# Patient Record
Sex: MALE | Race: WHITE | NOT HISPANIC OR LATINO | Employment: OTHER | URBAN - METROPOLITAN AREA
[De-identification: names, ages, dates, MRNs, and addresses within clinical notes are randomized per-mention and may not be internally consistent; named-entity substitution may affect disease eponyms.]

---

## 2017-03-28 ENCOUNTER — COMMUNICATION - HEALTHEAST (OUTPATIENT)
Dept: FAMILY MEDICINE | Facility: CLINIC | Age: 67
End: 2017-03-28

## 2017-03-28 DIAGNOSIS — G47.00 PERSISTENT DISORDER OF INITIATING OR MAINTAINING SLEEP: ICD-10-CM

## 2017-05-01 ENCOUNTER — OFFICE VISIT - HEALTHEAST (OUTPATIENT)
Dept: FAMILY MEDICINE | Facility: CLINIC | Age: 67
End: 2017-05-01

## 2017-05-01 DIAGNOSIS — Z00.00 ROUTINE GENERAL MEDICAL EXAMINATION AT A HEALTH CARE FACILITY: ICD-10-CM

## 2017-05-01 DIAGNOSIS — F41.1 GENERALIZED ANXIETY DISORDER: ICD-10-CM

## 2017-05-01 DIAGNOSIS — Z13.1 SCREENING FOR DIABETES MELLITUS: ICD-10-CM

## 2017-05-01 DIAGNOSIS — Z13.29 SCREENING FOR THYROID DISORDER: ICD-10-CM

## 2017-05-01 DIAGNOSIS — R94.8 ABNORMAL POSITRON EMISSION TOMOGRAPHY (PET) SCAN: ICD-10-CM

## 2017-05-01 DIAGNOSIS — G47.00 INSOMNIA: ICD-10-CM

## 2017-05-01 DIAGNOSIS — Z12.5 SCREENING FOR PROSTATE CANCER: ICD-10-CM

## 2017-05-01 DIAGNOSIS — R73.01 ELEVATED FASTING GLUCOSE: ICD-10-CM

## 2017-05-01 DIAGNOSIS — Z13.0 SCREENING FOR DEFICIENCY ANEMIA: ICD-10-CM

## 2017-05-01 DIAGNOSIS — Z23 NEED FOR TETANUS BOOSTER: ICD-10-CM

## 2017-05-01 DIAGNOSIS — Z13.220 SCREENING FOR CHOLESTEROL LEVEL: ICD-10-CM

## 2017-05-01 LAB
CHOLEST SERPL-MCNC: 175 MG/DL
FASTING STATUS PATIENT QL REPORTED: YES
HBA1C MFR BLD: 5.8 % (ref 3.5–6)
HDLC SERPL-MCNC: 57 MG/DL
LDLC SERPL CALC-MCNC: 102 MG/DL
PSA SERPL-MCNC: 0.4 NG/ML (ref 0–4.5)
TRIGL SERPL-MCNC: 82 MG/DL

## 2017-05-01 ASSESSMENT — MIFFLIN-ST. JEOR: SCORE: 1663.24

## 2017-05-02 ENCOUNTER — HOSPITAL ENCOUNTER (OUTPATIENT)
Dept: ULTRASOUND IMAGING | Facility: CLINIC | Age: 67
Discharge: HOME | End: 2017-05-02
Attending: NURSE PRACTITIONER

## 2017-05-02 ENCOUNTER — HOSPITAL ENCOUNTER (OUTPATIENT)
Dept: CT IMAGING | Facility: CLINIC | Age: 67
Discharge: HOME | End: 2017-05-02
Attending: NURSE PRACTITIONER

## 2017-05-02 ENCOUNTER — COMMUNICATION - HEALTHEAST (OUTPATIENT)
Dept: FAMILY MEDICINE | Facility: CLINIC | Age: 67
End: 2017-05-02

## 2017-05-02 DIAGNOSIS — R94.8 ABNORMAL POSITRON EMISSION TOMOGRAPHY (PET) SCAN: ICD-10-CM

## 2017-05-02 DIAGNOSIS — K11.8 MASS OF PAROTID GLAND: ICD-10-CM

## 2017-05-03 ENCOUNTER — AMBULATORY - HEALTHEAST (OUTPATIENT)
Dept: FAMILY MEDICINE | Facility: CLINIC | Age: 67
End: 2017-05-03

## 2017-05-03 DIAGNOSIS — K11.8 PAROTID MASS: ICD-10-CM

## 2017-05-08 ENCOUNTER — HOSPITAL ENCOUNTER (OUTPATIENT)
Dept: ULTRASOUND IMAGING | Facility: CLINIC | Age: 67
Discharge: HOME | End: 2017-05-08
Attending: NURSE PRACTITIONER

## 2017-05-08 DIAGNOSIS — K11.8 PAROTID MASS: ICD-10-CM

## 2017-05-08 DIAGNOSIS — R22.0 LOCALIZED SWELLING, MASS AND LUMP, HEAD: ICD-10-CM

## 2017-05-09 ENCOUNTER — COMMUNICATION - HEALTHEAST (OUTPATIENT)
Dept: FAMILY MEDICINE | Facility: CLINIC | Age: 67
End: 2017-05-09

## 2017-05-09 LAB
LAB AP CHARGES (HE HISTORICAL CONVERSION): NORMAL
LAB AP INITIAL CYTO EVAL (HE HISTORICAL CONVERSION): NORMAL
LAB MED GENERAL PATH INTERP (HE HISTORICAL CONVERSION): NORMAL
PATH REPORT.COMMENTS IMP SPEC: NORMAL
PATH REPORT.COMMENTS IMP SPEC: NORMAL
PATH REPORT.FINAL DX SPEC: NORMAL
PATH REPORT.MICROSCOPIC SPEC OTHER STN: NORMAL
PATH REPORT.RELEVANT HX SPEC: NORMAL
SPECIMEN DESCRIPTION: NORMAL

## 2017-09-09 ENCOUNTER — HEALTH MAINTENANCE LETTER (OUTPATIENT)
Age: 67
End: 2017-09-09

## 2018-04-04 ENCOUNTER — OFFICE VISIT - HEALTHEAST (OUTPATIENT)
Dept: FAMILY MEDICINE | Facility: CLINIC | Age: 68
End: 2018-04-04

## 2018-04-04 DIAGNOSIS — G89.29 CHRONIC LOW BACK PAIN: ICD-10-CM

## 2018-04-04 DIAGNOSIS — H10.9 CONJUNCTIVITIS: ICD-10-CM

## 2018-04-04 DIAGNOSIS — G44.209 TENSION HEADACHE: ICD-10-CM

## 2018-04-04 DIAGNOSIS — M54.50 CHRONIC LOW BACK PAIN: ICD-10-CM

## 2018-04-04 ASSESSMENT — MIFFLIN-ST. JEOR: SCORE: 1645.1

## 2018-04-05 ENCOUNTER — COMMUNICATION - HEALTHEAST (OUTPATIENT)
Dept: FAMILY MEDICINE | Facility: CLINIC | Age: 68
End: 2018-04-05

## 2018-04-05 DIAGNOSIS — H10.9 CONJUNCTIVITIS: ICD-10-CM

## 2018-04-06 ENCOUNTER — AMBULATORY - HEALTHEAST (OUTPATIENT)
Dept: FAMILY MEDICINE | Facility: CLINIC | Age: 68
End: 2018-04-06

## 2018-04-09 ENCOUNTER — HOSPITAL ENCOUNTER (OUTPATIENT)
Dept: PHYSICAL MEDICINE AND REHAB | Facility: CLINIC | Age: 68
Discharge: HOME | End: 2018-04-09
Attending: FAMILY MEDICINE

## 2018-04-09 DIAGNOSIS — M51.369 DISC DEGENERATION, LUMBAR: ICD-10-CM

## 2018-04-09 DIAGNOSIS — M47.816 LUMBAR FACET ARTHROPATHY: ICD-10-CM

## 2018-05-11 ENCOUNTER — COMMUNICATION - HEALTHEAST (OUTPATIENT)
Dept: FAMILY MEDICINE | Facility: CLINIC | Age: 68
End: 2018-05-11

## 2018-05-11 DIAGNOSIS — G47.00 INSOMNIA: ICD-10-CM

## 2018-05-11 DIAGNOSIS — F41.1 GENERALIZED ANXIETY DISORDER: ICD-10-CM

## 2018-05-23 ENCOUNTER — COMMUNICATION - HEALTHEAST (OUTPATIENT)
Dept: FAMILY MEDICINE | Facility: CLINIC | Age: 68
End: 2018-05-23

## 2019-02-05 ENCOUNTER — RECORDS - HEALTHEAST (OUTPATIENT)
Dept: ADMINISTRATIVE | Facility: OTHER | Age: 69
End: 2019-02-05

## 2019-07-07 ENCOUNTER — COMMUNICATION - HEALTHEAST (OUTPATIENT)
Dept: FAMILY MEDICINE | Facility: CLINIC | Age: 69
End: 2019-07-07

## 2019-07-07 DIAGNOSIS — F41.1 GENERALIZED ANXIETY DISORDER: ICD-10-CM

## 2019-07-07 DIAGNOSIS — G47.00 INSOMNIA: ICD-10-CM

## 2019-08-14 ENCOUNTER — OFFICE VISIT - HEALTHEAST (OUTPATIENT)
Dept: FAMILY MEDICINE | Facility: CLINIC | Age: 69
End: 2019-08-14

## 2019-08-14 DIAGNOSIS — Z79.899 HIGH RISK MEDICATIONS (NOT ANTICOAGULANTS) LONG-TERM USE: ICD-10-CM

## 2019-08-14 DIAGNOSIS — F41.1 GENERALIZED ANXIETY DISORDER: ICD-10-CM

## 2019-08-14 DIAGNOSIS — G47.00 INSOMNIA: ICD-10-CM

## 2019-08-14 DIAGNOSIS — Z86.0100 HISTORY OF COLONIC POLYPS: ICD-10-CM

## 2019-08-14 DIAGNOSIS — Z12.5 SCREENING FOR PROSTATE CANCER: ICD-10-CM

## 2019-08-14 DIAGNOSIS — N39.0 CHRONIC UTI: ICD-10-CM

## 2019-08-14 DIAGNOSIS — R73.03 PRE-DIABETES: ICD-10-CM

## 2019-08-14 LAB
ATRIAL RATE - MUSE: 62 BPM
DIASTOLIC BLOOD PRESSURE - MUSE: NORMAL MMHG
HBA1C MFR BLD: 6 % (ref 3.5–6)
INTERPRETATION ECG - MUSE: NORMAL
P AXIS - MUSE: 61 DEGREES
PR INTERVAL - MUSE: 136 MS
PSA SERPL-MCNC: 0.2 NG/ML (ref 0–4.5)
QRS DURATION - MUSE: 92 MS
QT - MUSE: 398 MS
QTC - MUSE: 403 MS
R AXIS - MUSE: 33 DEGREES
SYSTOLIC BLOOD PRESSURE - MUSE: NORMAL MMHG
T AXIS - MUSE: 56 DEGREES
VENTRICULAR RATE- MUSE: 62 BPM

## 2019-08-14 ASSESSMENT — MIFFLIN-ST. JEOR: SCORE: 1673.45

## 2020-01-24 ENCOUNTER — RECORDS - HEALTHEAST (OUTPATIENT)
Dept: ADMINISTRATIVE | Facility: OTHER | Age: 70
End: 2020-01-24

## 2020-04-15 ENCOUNTER — RECORDS - HEALTHEAST (OUTPATIENT)
Dept: ADMINISTRATIVE | Facility: OTHER | Age: 70
End: 2020-04-15

## 2020-10-03 ENCOUNTER — COMMUNICATION - HEALTHEAST (OUTPATIENT)
Dept: FAMILY MEDICINE | Facility: CLINIC | Age: 70
End: 2020-10-03

## 2020-10-03 DIAGNOSIS — G47.00 INSOMNIA: ICD-10-CM

## 2020-10-03 DIAGNOSIS — F41.1 GENERALIZED ANXIETY DISORDER: ICD-10-CM

## 2020-10-29 ENCOUNTER — OFFICE VISIT - HEALTHEAST (OUTPATIENT)
Dept: FAMILY MEDICINE | Facility: CLINIC | Age: 70
End: 2020-10-29

## 2020-10-29 DIAGNOSIS — R73.03 PRE-DIABETES: ICD-10-CM

## 2020-10-29 DIAGNOSIS — G47.00 INSOMNIA: ICD-10-CM

## 2020-10-29 DIAGNOSIS — M67.912 TENDINOPATHY OF LEFT SHOULDER: ICD-10-CM

## 2020-10-29 DIAGNOSIS — Z13.220 SCREENING FOR CHOLESTEROL LEVEL: ICD-10-CM

## 2020-10-29 DIAGNOSIS — Z51.81 ENCOUNTER FOR THERAPEUTIC DRUG MONITORING: ICD-10-CM

## 2020-10-29 DIAGNOSIS — N39.0 CHRONIC UTI: ICD-10-CM

## 2020-10-29 DIAGNOSIS — F41.1 GENERALIZED ANXIETY DISORDER: ICD-10-CM

## 2020-10-29 LAB
ALBUMIN SERPL-MCNC: 4.2 G/DL (ref 3.5–5)
ALP SERPL-CCNC: 85 U/L (ref 45–120)
ALT SERPL W P-5'-P-CCNC: 30 U/L (ref 0–45)
ANION GAP SERPL CALCULATED.3IONS-SCNC: 11 MMOL/L (ref 5–18)
AST SERPL W P-5'-P-CCNC: 26 U/L (ref 0–40)
BILIRUB SERPL-MCNC: 0.5 MG/DL (ref 0–1)
BUN SERPL-MCNC: 14 MG/DL (ref 8–22)
CALCIUM SERPL-MCNC: 9.1 MG/DL (ref 8.5–10.5)
CHLORIDE BLD-SCNC: 106 MMOL/L (ref 98–107)
CHOLEST SERPL-MCNC: 172 MG/DL
CO2 SERPL-SCNC: 23 MMOL/L (ref 22–31)
CREAT SERPL-MCNC: 0.89 MG/DL (ref 0.7–1.3)
FASTING STATUS PATIENT QL REPORTED: NO
GFR SERPL CREATININE-BSD FRML MDRD: >60 ML/MIN/1.73M2
GLUCOSE BLD-MCNC: 97 MG/DL (ref 70–125)
HBA1C MFR BLD: 5.9 %
HDLC SERPL-MCNC: 60 MG/DL
LDLC SERPL CALC-MCNC: 96 MG/DL
POTASSIUM BLD-SCNC: 4.5 MMOL/L (ref 3.5–5)
PROT SERPL-MCNC: 7.1 G/DL (ref 6–8)
SODIUM SERPL-SCNC: 140 MMOL/L (ref 136–145)
TRIGL SERPL-MCNC: 81 MG/DL

## 2020-10-29 ASSESSMENT — MIFFLIN-ST. JEOR: SCORE: 1645.95

## 2020-11-19 LAB
ATRIAL RATE - MUSE: 52 BPM
DIASTOLIC BLOOD PRESSURE - MUSE: NORMAL
INTERPRETATION ECG - MUSE: NORMAL
P AXIS - MUSE: 63 DEGREES
PR INTERVAL - MUSE: 140 MS
QRS DURATION - MUSE: 90 MS
QT - MUSE: 430 MS
QTC - MUSE: 399 MS
R AXIS - MUSE: 37 DEGREES
SYSTOLIC BLOOD PRESSURE - MUSE: NORMAL
T AXIS - MUSE: 52 DEGREES
VENTRICULAR RATE- MUSE: 52 BPM

## 2021-01-01 ENCOUNTER — TRANSFERRED RECORDS (OUTPATIENT)
Dept: MULTI SPECIALTY CLINIC | Facility: CLINIC | Age: 71
End: 2021-01-01

## 2021-05-30 VITALS — BODY MASS INDEX: 27.44 KG/M2 | WEIGHT: 196 LBS | HEIGHT: 71 IN

## 2021-05-30 NOTE — TELEPHONE ENCOUNTER
RN cannot approve Refill Request    RN can NOT refill this medication PCP messaged that patient is overdue for Physical  and Protocol failed and RN gave three month refill. Last office visit: Visit date not found Last Physical: 5/1/2017 Last MTM visit: Visit date not found Last visit same specialty: 4/4/2018 Jacquelyn Ortiz MD.  Next visit within 3 mo: Visit date not found  Next physical within 3 mo: Visit date not found  Last OV 4/4/2018.  Was due for PE May 2018  Last refill of trazodone was 5/11/2018 for 90/3; last refill of citalopram was 5/11/2018 for 90/3     Kathleen Vega, [facility] Triage/Med Refill 7/7/2019    Requested Prescriptions   Pending Prescriptions Disp Refills     traZODone (DESYREL) 100 MG tablet [Pharmacy Med Name: traZODone HCl Oral Tablet 100 MG] 90 tablet 2     Sig: Take 1 tablet (100 mg total) by mouth at bedtime.       Tricyclics/Misc Antidepressant/Antianxiety Meds Refill Protocol Failed - 7/7/2019  7:01 AM        Failed - PCP or prescribing provider visit in last year     Last office visit with prescriber/PCP: Visit date not found OR same dept: Visit date not found OR same specialty: 4/4/2018 Jacquelyn Ortiz MD  Last physical: 5/1/2017 Last MTM visit: Visit date not found   Next visit within 3 mo: Visit date not found  Next physical within 3 mo: Visit date not found  Prescriber OR PCP: Poornima Correa CNP  Last diagnosis associated with med order: 1. Insomnia  - traZODone (DESYREL) 100 MG tablet [Pharmacy Med Name: traZODone HCl Oral Tablet 100 MG]; Take 1 tablet (100 mg total) by mouth at bedtime.  Dispense: 90 tablet; Refill: 2    2. Generalized anxiety disorder  - citalopram (CELEXA) 10 MG tablet [Pharmacy Med Name: Citalopram Hydrobromide Oral Tablet 10 MG]; Take 1 tablet (10 mg total) by mouth daily.  Dispense: 90 tablet; Refill: 2    If protocol passes may refill for 12 months if within 3 months of last provider visit (or a total of 15 months).              citalopram (CELEXA) 10 MG tablet [Pharmacy Med Name: Citalopram Hydrobromide Oral Tablet 10 MG] 90 tablet 2     Sig: Take 1 tablet (10 mg total) by mouth daily.       SSRI Refill Protocol  Failed - 7/7/2019  7:01 AM        Failed - PCP or prescribing provider visit in last year     Last office visit with prescriber/PCP: Visit date not found OR same dept: Visit date not found OR same specialty: 4/4/2018 Jacquelyn Ortiz MD  Last physical: 5/1/2017 Last MTM visit: Visit date not found   Next visit within 3 mo: Visit date not found  Next physical within 3 mo: Visit date not found  Prescriber OR PCP: Poornima Correa CNP  Last diagnosis associated with med order: 1. Insomnia  - traZODone (DESYREL) 100 MG tablet [Pharmacy Med Name: traZODone HCl Oral Tablet 100 MG]; Take 1 tablet (100 mg total) by mouth at bedtime.  Dispense: 90 tablet; Refill: 2    2. Generalized anxiety disorder  - citalopram (CELEXA) 10 MG tablet [Pharmacy Med Name: Citalopram Hydrobromide Oral Tablet 10 MG]; Take 1 tablet (10 mg total) by mouth daily.  Dispense: 90 tablet; Refill: 2    If protocol passes may refill for 12 months if within 3 months of last provider visit (or a total of 15 months).

## 2021-05-31 NOTE — PROGRESS NOTES
Assessment & Plan   1. Generalized anxiety disorder  Anxiety continues to be well controlled on low-dose citalopram.  He prefers to continue on this medication long-term.  Refilled for 1 year.  Recommended physical with his med check next year.  Normal EKG today with concurrent use of citalopram and trazodone.  - citalopram (CELEXA) 10 MG tablet; Take 1 tablet (10 mg total) by mouth daily. Dispense: 90 tablet; Refill: 3    2. Insomnia  Doing well with trazodone 100 mg.  He states on very rare occasions approximately 3 times a year he takes 150 mg.  - traZODone (DESYREL) 100 MG tablet; Take 1 tablet (100 mg total) by mouth at bedtime.   Dispense: 90 tablet; Refill: 3    3. Pre-diabetes  Recheck labs.  He is engaging in regular physical activity and healthy diet.  No significant family history of diabetes.  - Glycosylated Hemoglobin A1c    4. Screening for prostate cancer  - PSA (Prostatic-Specific Antigen), Annual Screen    5. High risk medications (not anticoagulants) long-term use  - Electrocardiogram Perform - Clinic    6. Chronic UTI  Continue to follow with urology.    7. History of colonic polyps  [facility].    Poornima Correa CNP    Subjective   Chief Complaint:  No chief complaint on file.    HPI:   Artemio Kruse is a 69 y.o. male who presents for medication check.    He states he has been well.  He has been following with [facility] for acute UTI.  He did undergo cystoscopy as well.  He is now on chronic cephalexin therapy.  Also started on Flomax for overactive bladder symptoms.  He states symptoms are much improved with this.    Anxiety:  On citalopram 10mg and trazodone 100mg for sleep.  Believes anxiety continues to be well controlled.  He did try going off the citalopram in the distant past and states he was very irritable and anxious without the medication.  He continues to sleep well with the trazodone.  He is due for EKG for monitoring for QT  prolongation on this medication combination.  Last in 2016.    H/o colon polyps. S/p sigmoid colectomy in 2016. [facility]    Prediabetes: A1c 5.82 years ago.  He states he exercises quite regularly.  He meets with a  once a week for resistance training and cardio.  He is quite active in his job.  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  He states he runs as well.  Diet is very healthy.      Allergies:  has No Known Allergies.    SH/FH:  Social History and Family History reviewed and updated.   Tobacco Status:  He  reports that he has never smoked. He has never used smokeless tobacco.    Review of Systems:  A complete head to toe ROS is negative unless otherwise noted in HPI    Objective   There were no vitals filed for this visit.    Physical Exam:  GENERAL: Alert, well-appearing male .   PSYCH: Pleasant mood, affect appropriate.   CV: Regular rate and rhythm without murmurs, rubs or gallops.  RESP: Lung sounds clear

## 2021-06-01 VITALS — BODY MASS INDEX: 26.88 KG/M2 | WEIGHT: 192 LBS | HEIGHT: 71 IN

## 2021-06-01 VITALS — WEIGHT: 191 LBS | BODY MASS INDEX: 27.02 KG/M2

## 2021-06-03 VITALS — BODY MASS INDEX: 27.75 KG/M2 | WEIGHT: 198.25 LBS | HEIGHT: 71 IN

## 2021-06-05 VITALS
RESPIRATION RATE: 16 BRPM | TEMPERATURE: 98.5 F | BODY MASS INDEX: 26.91 KG/M2 | SYSTOLIC BLOOD PRESSURE: 134 MMHG | DIASTOLIC BLOOD PRESSURE: 76 MMHG | HEART RATE: 64 BPM | WEIGHT: 192.19 LBS | HEIGHT: 71 IN

## 2021-06-10 NOTE — PROGRESS NOTES
MALE PREVENTIVE EXAM    Subjective:   Chief Complaint:  Annual Exam (fasting)    HPI:  Artemio Kruse is a 67 y.o. male who presents for routine physical exam.    He is a previous patient of Dr. Perez. PMH notable for colonic polyps and stricture, JOHN and insomnia.     Hx of colonic polyps and stricture with abnormal air contrast barium enema. PET scan was performed 01/2016 that showed mural thickening in mid sigmoid colon suspicious for malignancy.  Underwent sigmoid colectomy 03/2016.  Records unavailable, states pathology was negative.  PET scan also noted 0.5cm and 0.9cm nodules in left upper lobe as well as focal update in right parotid gland suspicious for parotid neoplasm.  US was recommended for evaluation.  He states he was not aware of this and did not have follow up.      Anxiety:  Has been on citalopram for 3-4 years.  Previously tried venlafaxine and several other SSRIs with varying side effects.  He feels anxiety is well controlled on citalopram.  Denies recent stressors.  Taking trazodone for insomnia as well as magnolia bark extract.      Immunizations:  Due for tetanus booster, pneumonia vaccine. Declines pneumonia, states wife does not believe in vaccines.     Preventive Health:  Reviewed and recommended screening and treatment recommendations:  PSA: The natural history of prostate cancer and ongoing controversy regarding screening and potential treatment outcomes of prostate cancer has been discussed with the patient. The meaning of a false positive PSA and a false negative PSA has been discussed. He indicates understanding of the limitations of this screening test.  yes  Colonoscopy:  Annually, will call to schedule  Immunizations: declines pneumovax    Health Habits:    Exercise: yes, cardio and resistance training several times a week.   Balanced Diet: yes  Seat Belt Use: yes  Calcium intake/Osteoporosis prevention: no  Guns: NO  Sun Screen: YES  Dental Care: YES    PMH:   Patient Active  Problem List   Diagnosis     Persistent Insomnia     Anxiety     Chronic low back pain       No past medical history on file.    Current Medications: Reviewed  Current Outpatient Prescriptions on File Prior to Visit   Medication Sig Dispense Refill     ASCORBATE CALCIUM (VITAMIN C ORAL) Take 1,000 mg by mouth daily. Take 2 tablet of 500mg PO daily       citalopram (CELEXA) 20 MG tablet TAKE 0.5 TABLETS (10 MG TOTAL) BY MOUTH DAILY. 15 tablet 0     LACTOBACILLUS ACIDOPHILUS (ACIDOPHILUS ORAL) Take 1 tablet by mouth daily.       OMEGA-3/DHA/EPA/FISH OIL (FISH OIL-OMEGA-3 FATTY ACIDS) 300-1,000 mg capsule Take 2 g by mouth daily.       traZODone (DESYREL) 100 MG tablet TAKE 1 TABLET (100 MG TOTAL) BY MOUTH BEDTIME. 30 tablet 0     UNABLE TO FIND Take 1 capsule by mouth daily. Med Name: Center bark Extract       CHOLECALCIFEROL, VITAMIN D3, ORAL Take 5,000 Units by mouth daily.       [DISCONTINUED] LYSINE ORAL Take 1,000 mg by mouth daily.       [DISCONTINUED] UNABLE TO FIND Take 1 tablet by mouth daily. Med Name: protandim       No current facility-administered medications on file prior to visit.        Allergies: Reviewed   has No Known Allergies.    Social History:  Social History     Occupational History     [employer]      Social History Main Topics     Smoking status: Never Smoker     Smokeless tobacco: Never Used     Alcohol use Yes      Comment: 4 beer bottle size hard cider per week     Drug use: No     Sexual activity: Yes     Partners: Female      Comment:  since 1975       Family History:   Family History   Problem Relation Age of Onset     Breast cancer Mother      Pancreatitis Father      Depression Sister      Tongue cancer Brother      Hyperlipidemia Brother      Depression Brother      Suicide     Valvular heart disease Brother          Review of Systems:  Complete head to toe review of systems is otherwise negative except as above.    Objective:    There were no vitals taken for  this visit.    GENERAL: Alert, cooperative, well-appearing male.   PSYCH: Pleasant mood, affect appropriate.  Good judgment and insight.   SKIN: No lesions, erythema, edema. Normal hair distrubution   HEAD: Normocephalic, atraumatic  EYES: Conjunctiva pink, sclera white, no exudates. CAMILO.  EOMs intact. Corneal light reflex bilaterally, red reflex present. Undilated fundoscopic exam normal  EARS: TMs pearly grey, no bulging, redness, retraction. Hearing grossly normal.   NOSE: Nares patent, no discharge.    MOUTH: Pharynx moist, pink without exudate. No tonsillar enlargement  NECK: No lymphadenopathy. Thyroid borders smooth without enlargement, nodules.   CV: Regular rate and rhythm without murmurs, rubs or gallops.  RESP: Lung sounds clear, symmetric excursion.   ABDOMEN: BS+. Abdomen soft, nontender to palpation without guarding. No organomegaly  :  Normal scrotum.  Testes descended bilaterally without mass or hernia. Penis circumcised without lesions or discharge.  MSK: Spine and extremities in alignment.   NEURO: Alert, oriented. Normal motor and sensory  PV : LEs warm, pink, symmetric hair distribution.       Assessment & Plan   1. Routine general medical examination at a health care facility:      2. Generalized anxiety disorder:  Anxiety well controlled on citalopram, refilled for one year  - citalopram (CELEXA) 10 MG tablet; Take 1 tablet (10 mg total) by mouth daily.  Dispense: 90 tablet; Refill: 3    3. Insomnia  - traZODone (DESYREL) 100 MG tablet; TAKE 1 TABLET (100 MG TOTAL) BY MOUTH BEDTIME.  Dispense: 90 tablet; Refill: 3    4. Abnormal positron emission tomography (PET) scan: Follow up on lung nodules and parotid abnormalities with CT chest and Parotid US  - US Parotid; Future  - CT Chest Without Contrast; Future    5. Screening for cholesterol level  - Lipid Cascade    6. Screening for diabetes mellitus  - Basic Metabolic Panel    7. Screening for deficiency anemia  - HM2(CBC w/o  Differential)    8. Screening for thyroid disorder  - Thyroid [place]    9. Screening for prostate cancer  - PSA (Prostatic-Specific Antigen), Annual Screen    10. Need for tetanus booster  - Td, Adult, Adsorbed (blue label)    11. Elevated fasting glucose  - Glycosylated Hemoglobin A1c    Alcohol use, safety and moderation discussed.  Recommended adequate calcium intake/osteoporosis prevention.  Discussed colon cancer screening at age 50, 45 if -American.  Diet discuss, including moderation of portions sizes, avoiding eating out and fast food and increase in fruits and vegetables.  Discussed safe sex practices.  Discussed & recommended seat belt (& motorcycle helmet) use.      Poornima Correa, CNP

## 2021-06-12 NOTE — TELEPHONE ENCOUNTER
RN cannot approve Refill Request    RN can NOT refill this medication PCP messaged that patient is overdue for Office Visit. Last office visit: 8/14/2019 Poornima Correa CNP Last Physical: 5/1/2017 Last MTM visit: Visit date not found Last visit same specialty: 8/14/2019 Poornima Correa CNP.  Next visit within 3 mo: Visit date not found  Next physical within 3 mo: Visit date not found      Frida Quiroga, [facility] Triage/Med Refill 10/3/2020    Requested Prescriptions   Pending Prescriptions Disp Refills     citalopram (CELEXA) 10 MG tablet [Pharmacy Med Name: Citalopram Hydrobromide Oral Tablet 10 MG] 90 tablet 0     Sig: Take 1 tablet (10 mg total) by mouth daily. Call 24/7 to set up physical before further refills       SSRI Refill Protocol  Failed - 10/3/2020  2:00 AM        Failed - PCP or prescribing provider visit in last year     Last office visit with prescriber/PCP: 8/14/2019 Poornima Correa CNP OR same dept: Visit date not found OR same specialty: 8/14/2019 Poornima Correa CNP  Last physical: 5/1/2017 Last MTM visit: Visit date not found   Next visit within 3 mo: Visit date not found  Next physical within 3 mo: Visit date not found  Prescriber OR PCP: Poornima Correa CNP  Last diagnosis associated with med order: 1. Generalized anxiety disorder  - citalopram (CELEXA) 10 MG tablet [Pharmacy Med Name: Citalopram Hydrobromide Oral Tablet 10 MG]; Take 1 tablet (10 mg total) by mouth daily. Call 24/7 to set up physical before further refills  Dispense: 90 tablet; Refill: 0    2. Insomnia  - traZODone (DESYREL) 100 MG tablet [Pharmacy Med Name: traZODone HCl Oral Tablet 100 MG]; Take 1 tablet (100 mg total) by mouth at bedtime. Physical overdue  Dispense: 90 tablet; Refill: 0    If protocol passes may refill for 12 months if within 3 months of last provider visit (or a total of 15 months).                traZODone (DESYREL) 100 MG tablet [Pharmacy Med Name: traZODone HCl Oral Tablet 100 MG] 90 tablet 0      Sig: Take 1 tablet (100 mg total) by mouth at bedtime. Physical overdue       Tricyclics/Misc Antidepressant/Antianxiety Meds Refill Protocol Failed - 10/3/2020  2:00 AM        Failed - PCP or prescribing provider visit in last year     Last office visit with prescriber/PCP: 8/14/2019 Poornima Correa CNP OR same dept: Visit date not found OR same specialty: 8/14/2019 Poornima Correa CNP  Last physical: 5/1/2017 Last MTM visit: Visit date not found   Next visit within 3 mo: Visit date not found  Next physical within 3 mo: Visit date not found  Prescriber OR PCP: Poornima Correa CNP  Last diagnosis associated with med order: 1. Generalized anxiety disorder  - citalopram (CELEXA) 10 MG tablet [Pharmacy Med Name: Citalopram Hydrobromide Oral Tablet 10 MG]; Take 1 tablet (10 mg total) by mouth daily. Call 24/7 to set up physical before further refills  Dispense: 90 tablet; Refill: 0    2. Insomnia  - traZODone (DESYREL) 100 MG tablet [Pharmacy Med Name: traZODone HCl Oral Tablet 100 MG]; Take 1 tablet (100 mg total) by mouth at bedtime. Physical overdue  Dispense: 90 tablet; Refill: 0    If protocol passes may refill for 12 months if within 3 months of last provider visit (or a total of 15 months).

## 2021-06-12 NOTE — PROGRESS NOTES
Assessment & Plan   1. Generalized anxiety disorder  Well controlled with low dose citalopram. Desires to continue this long-term. EKG today for monitoring with trazodone use.  Recheck one year.    - citalopram (CELEXA) 10 MG tablet; Take 1 tablet (10 mg total) by mouth daily. Due for med check appointment  Dispense: 90 tablet; Refill: 3    2. Insomnia  Well controlled with trazodone  - traZODone (DESYREL) 100 MG tablet; Take 1 tablet (100 mg total) by mouth at bedtime. Due for med check appointment  Dispense: 90 tablet; Refill: 3    3. Pre-diabetes  Recheck labs.  Exercising regularly though diet is high in simple carbohydrates. Discussed recommended changes.   - Comprehensive Metabolic Panel  - Glycosylated Hemoglobin A1c    4. Chronic UTI  Follows with urology    5. Encounter for therapeutic drug monitoring  - Electrocardiogram Perform and Read    6. Tendinopathy of left shoulder  Following with PT, good improvement thus far    7. Screening for cholesterol level  - Lipid Cascade    Declines all immunizations    Poornima Correa CNP    Subjective   Chief Complaint:  Med Check (Trazodone and citalopram)    HPI:   Artemio Kruse is a 70 y.o. male who presents for medication check.     JOHN: On citalopram 10mg for many years.  He has been well. Lives with his wife. Seeing their immediate family as well. Has not noted any increased anxiety. Due for EKG to r/o QT prolongation with concurrent trazodone.     Insomnia:  Trazodone 100mg. Falls asleep easily with this.  Awakens typically twice nightly though falls back asleep quickly     Chronic UTI:  Follows with urology, on daily Keflex    L shoulder arthritis/tendinopathy:  Injured with weight lifting in the spring and then had a fall on rollerbl[employer] which worsened pain. Has followed with [facility] previously, now private PT provider.     Pre-diabetes: A1C 6.0 last year.  Has been 'closing his rings' daily on his Apple watch.  Walking 1.5 miles at high speed daily.   "Considering getting back in with his , wife bought exercise bike.  Admits carbohydrate intake is high.  Cookies for breakfast this morning.     Blood pressure:  Mildly elevated today and one year ago. States this is always higher at the doctors office.  Does not check at home.  Again, walking daily.  Alcohol 3x/week.     Allergies:  has No Known Allergies.    SH/FH:  Social History and Family History reviewed and updated.   Tobacco Status:  He  reports that he has never smoked. He has never used smokeless tobacco.    Review of Systems:  A complete head to toe ROS is negative unless otherwise noted in HPI    Objective     Vitals:    10/29/20 1042   BP: 132/68   Pulse: 64   Resp: 16   Temp: 98.5  F (36.9  C)   TempSrc: Oral   Weight: 192 lb 3 oz (87.2 kg)   Height: 5' 10.5\" (1.791 m)       Physical Exam:  GENERAL: Alert, well-appearing   PSYCH: A & O x 3, thought processes congruent, non tangential.  No hallucinations or delusions.  Insight and judgement: intact.  Denies suicidal/homicidal ideations.  NECK: No lymphadenopathy. Thyroid borders smooth without enlargement, nodules.   CV: Regular rate and rhythm without murmurs, rubs or gallops.  RESP: Lung sounds clear  PV : No edema      "

## 2021-06-17 NOTE — PROGRESS NOTES
"SUBJECTIVE: Artemio Kruse is a 68 y.o. male with:  Chief Complaint   Patient presents with     Headache     Tightness on head. possible TMJ    He has had tightness on the left side of his head.  It hurts to chew.  Worse in the am.  Going on for 1 week.  Cold feels good.  He has had more stress lately at work.  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  He does not sleep well.  He is missing 2 molars on right upper jaw so has been chewing on left side of mouth for months.  He is going to get dental implants in May.  No jaw clicking or popping.  No pain in neck or upper back.  No rash.    He has has had redness and white discharge from his left eye for the last 5 days.  No pain in the eye.  Has glasses. Does not wear contacts.  He has some right sinus congestion but no recent URI.  There is a little crusting from the right eye as well.    He has chronic low back pain but has not attended to this in awhile. He wonders what can be done about his back.  He did have hemilaminectomy in the past.  He feels he has a high pain threshold so puts up with a lot of discomfort.    He has had recent colonoscopy.  Last PSA was 5/17.    SH: .  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  Patient Active Problem List   Diagnosis     Persistent Insomnia     Anxiety     Chronic low back pain      OBJECTIVE: /78 (Patient Site: Left Arm, Patient Position: Sitting, Cuff Size: Adult Large)  Pulse 72  Ht 5' 10.5\" (1.791 m)  Wt 192 lb (87.1 kg)  BMI 27.16 kg/m2   No acute distress.  HEENT: Head is atraumatic and/normocephalic.  No rash on scalp. PERRL.  Conjunctiva mildly injected left medial eye.  Tympanic membranes grey with normal landmarks and normal light reflexes.  No nasal discharge.  Oropharynx is pink and moist.  He is missing 2 molars right upper jaw. Sinuses nontender.  TMJs with no crepitus / popping / locking.  Neck: Supple.  No lymphadenopathy or " thyromegaly.  Back: No tenderness over thoracic spine or upper trapezius muscles.  Lungs: Clear to auscultation.  No wheezing, retractions, or tachypnea.  Heart: RRR. S1 and S2 normal.  No murmurs, rubs, or gallops.  Neuro: Awake, alert, oriented x 3.  Normal strength and tone.  Normal gait.     Artemio was seen today for headache.    Diagnoses and all orders for this visit:    Tension headache- I think he is having muscle tension from imbalance in chewing over the last few months.  Increased stress may also play a role.  Will try muscle relaxant before sleep.  -     baclofen (LIORESAL) 10 MG tablet; Take 1 tablet (10 mg total) by mouth 3 (three) times a day.    Conjunctivitis  -     tobramycin (TOBREX) 0.3 % ophthalmic solution; Apply 1 drop to eye every 4 (four) hours.    Chronic low back pain  -     Ambulatory referral to [facility]    Return for physical in May.     Jacquelyn Ortiz

## 2021-06-17 NOTE — PROGRESS NOTES
ASSESSMENT: Artemio Kruse is a 68 y.o. male with past medical history significant for insomnia, anxiety who presents today for new patient evaluation of low back pain without radicular symptoms.  Patient does have a history of hemilaminectomy 20 years ago.  I suspect that the patient has disc degeneration and lumbar facet arthropathy.  He is neurologically intact on exam.    SUJATHA: 20  NDI: 24  Who 5: 18    PLAN:  A shared decision making model was used.  The patient's values and choices were respected.  The following represents what was discussed and decided upon by the physician assistant and the patient.      1.  DIAGNOSTIC TESTS: I placed an order for an x-ray lumbar spine.  I would like to rule out spondylolisthesis before he begins the [facility] physical therapy program.  If pain fails to improve, would recommend obtaining an MRI lumbar spine for further evaluation.    2.  PHYSICAL THERAPY: I would like the patient to begin [facility] physical therapy.  We will await results of his x-ray to rule out spondylolisthesis.  Of note, the patient lives in [place] and he would like to participate in physical therapy closer to his home.  I believe [facility] has a location for [facility] in his area.    3.  MEDICATIONS: No changes are made to the patient's medications.  He is currently using baclofen 10 mg twice daily and Aleve as needed.  This is primarily for TMJ pain, although he notes it helps with his back pain as well.  The patient prefers not to take pain relieving medications if possible.    4.  INTERVENTIONS: No interventions were ordered.  The patient may benefit from interventional pain management if he fails to improve with conservative treatment, depending on results of advanced imaging studies.    5.  PATIENT EDUCATION: The patient is in agreement with the above plan.  All questions were answered.    6.  FOLLOW-UP:   A nurse will call the patient with results of his x-ray.  As long as there  is no spondylolisthesis, I will refer the patient for [facility] physical therapy through [facility] closer to his home in [place].  If there is spondylolisthesis, I would recommend obtaining flexion extension x-rays to evaluate for instability.  If he has any questions or concerns in the meantime, he should not hesitate to contact our clinic.      SUBJECTIVE:  Artemio Kruse  Is a 68 y.o. male who presents today in consultation at the request of Erin patel CNP for new patient evaluation of low back pain.  The patient states that he has had low back pain for at least 40 years.  He denies any injury or event to cause the pain.  He states that he has episodes of pain approximately every 1-2 months the last 1-3 weeks.  His most recent episode began on March 26, 2018.  The patient states that he typically feels some pain coming on, and then with minimal activity such as sneezing, or bending over, he has sudden onset of severe low back pain.  He states that he typically gets a significant lumbar shift associated with the pain.  Chiropractic manipulation helps with his flareups, but he would like to see if there is anything else he can do to help with his low back pain.  The patient does have a previous history of a hemilaminectomy about 20 years ago.    The patient complains of centralized low back pain.  The patient states that his pain is like someone driving and absent in his lower back.  He denies any radiation of the pain into the buttocks or down the legs.  He denies any numbness, tingling, or weakness down the legs.  The patient states that any movement while he is in a flareup of pain aggravates it.  Applying ice helps alleviate the pain.  The patient denies any loss of bowel or bladder control.  He denies any recent fevers, chills, sweats.    The patient has never had physical therapy for his lower back.  He goes to a chiropractor once per week for maintenance.  He goes to the  chiropractor 2-3 times per week during a flareup.  The patient tried an injection 20 years ago which was not helpful.  He went on to have a hemilaminectomy.  The patient is currently using baclofen 10 mg twice daily and Aleve as needed for TMJ pain, although he notes that it has been helping with his back pain as well.  The patient does prefer  not to take pain relieving medications if possible.    Current Outpatient Prescriptions on File Prior to Encounter   Medication Sig Dispense Refill     ASCORBATE CALCIUM (VITAMIN C ORAL) Take 1,000 mg by mouth daily. Take 2 tablet of 500mg PO daily       baclofen (LIORESAL) 10 MG tablet Take 1 tablet (10 mg total) by mouth 3 (three) times a day. 30 tablet 1     citalopram (CELEXA) 10 MG tablet Take 1 tablet (10 mg total) by mouth daily. 90 tablet 3     fluocinonide (LIDEX) 0.05 % cream        LACTOBACILLUS ACIDOPHILUS (ACIDOPHILUS ORAL) Take 1 tablet by mouth daily.       OMEGA-3/DHA/EPA/FISH OIL (FISH OIL-OMEGA-3 FATTY ACIDS) 300-1,000 mg capsule Take 2 g by mouth daily.       polymyxin B-trimethoprim (FOR POLYTRIM) 10,000 unit- 1 mg/mL Drop ophthalmic drops Use 1 drop in affected eye every 3 hours until clear then TID for 3 more days. 1 Bottle 0     traZODone (DESYREL) 100 MG tablet TAKE 1 TABLET (100 MG TOTAL) BY MOUTH BEDTIME. 90 tablet 3     UNABLE TO FIND Take 1 capsule by mouth daily. Med Name: [place] bark Extract       tobramycin (TOBREX) 0.3 % ophthalmic solution Apply 1 drop to eye every 4 (four) hours. 5 mL 0     [DISCONTINUED] citalopram (CELEXA) 20 MG tablet TAKE 0.5 TABLETS (10 MG TOTAL) BY MOUTH DAILY. 15 tablet 0         No Known Allergies    Patient Active Problem List   Diagnosis     Persistent Insomnia     Anxiety     Chronic low back pain         Past Surgical History:   Procedure Laterality Date     KNEE ARTHROSCOPY W/ PARTIAL MEDIAL MENISCECTOMY Left      SC HEMORRHOIDECTOMY INTERNAL RUBBER BAND LIGATIONS      Description: Hemorrhoidectomy;  Recorded:  10/24/2011;     NJ LAMNOTMY INCL W/DCMPRSN NRV ROOT 1 INTRSPC LUMBR      Description: Hemilaminectomy With Disc Removal One Lumbar Interspace;  Recorded: 10/24/2011;     ROTATOR CUFF REPAIR Right        Family History   Problem Relation Age of Onset     Breast cancer Mother      Pancreatitis Father      Depression Sister      Tongue cancer Brother      squamous cell CA     Hyperlipidemia Brother      Depression Brother      Suicide     Valvular heart disease Brother      Social history: The patient is .  He works as a manager at a manufacturing facility.  The patient drinks 1-3 alcoholic beverages per week.  He denies tobacco use.  Denies illicit drug use.    ROS: Positive for poor sleep quality, back pain.  Specifically negative for bowel/bladder dysfunction, fevers,chills, appetite changes, unexplained weight loss.   Otherwise 13 systems reviewed are negative.  Please see the patient's intake questionnaire from today for details.      OBJECTIVE:  PHYSICAL EXAMINATION:    CONSTITUTIONAL:  Vital signs as above.  No acute distress.  The patient is well nourished and well groomed.  PSYCHIATRIC:  The patient is awake, alert, oriented to person, place, time and answering questions appropriately with clear speech.    HEENT: Normocephalic, atraumatic.  Sclera clear.  Neck is supple.  SKIN:  Skin over the face, bilateral lower extremities, and posterior torso is clean, dry, intact without rashes.    GAIT:  Gait is non-antalgic.  The patient is able to heel and toe walk without significant difficulty.    STANDING EXAMINATION: No tenderness palpation of the bilateral lower lumbar paraspinous muscles.  No tenderness palpation of the lumbar spinous processes.  Lumbar flexion moderately restricted (baseline per patient).  Lumbar extension intact.  Lumbar facet loading maneuvers did reproduce pain on the left.  MUSCLE STRENGTH:  The patient has 5/5 strength for the bilateral hip flexors, knee flexors/extensors, ankle  dorsiflexors/plantar flexors, great toe extensors, ankle evertors/invertors.  NEUROLOGICAL: 2+ patellar, and achilles reflexes bilaterally.  Negative Babinski's bilaterally.  No ankle clonus bilaterally. Sensation to light touch is intact in the bilateral L4, L5, and S1 dermatomes.  VASCULAR:  2/4 dorsalis pedis pulses bilaterally.  Bilateral lower extremities are warm.  There is no pitting edema of the bilateral lower extremities.  ABDOMINAL:  Soft, non-distended, non-tender throughout all quadrants.  No pulsatile mass palpated in the left lower quadrant.  LYMPH NODES:  No palpable or tender inguinal lymph nodes.  MUSCULOSKELETAL: Straight leg raise negative bilaterally.

## 2021-07-03 NOTE — ADDENDUM NOTE
Addendum Note by Joe Avery MA at 4/9/2018  3:47 PM     Author: Joe Avery MA Service: -- Author Type: Medical Assistant    Filed: 4/9/2018  3:47 PM Date of Service: 4/9/2018  3:47 PM Status: Signed    : Joe Avery MA (Medical Assistant)    Encounter addended by: Joe vAery MA on: 4/9/2018  3:47 PM<BR>     Actions taken: Visit Navigator Flowsheet section accepted

## 2022-01-15 DIAGNOSIS — F41.1 GENERALIZED ANXIETY DISORDER: Primary | ICD-10-CM

## 2022-01-17 NOTE — TELEPHONE ENCOUNTER
Outpatient Medication Detail     Disp Refills Start End TOM   traZODone (DESYREL) 100 MG tablet 90 tablet 3 10/29/2020  No   Sig - Route: Take 1 tablet (100 mg total) by mouth at bedtime. Due for med check appointment - Oral   Sent to pharmacy as: traZODone 100 mg tablet (DESYREL)   E-Prescribing Status: Receipt confirmed by pharmacy (10/29/2020 11:10 AM CDT)       traZODone (DESYREL) 100 MG tablet [[id number]]    Electronically signed by: Poornima Correa CNP on 10/29/20 1109 Status: Active   Ordering user: Poornima Correa CNP 10/29/20 1109 Authorized by: Poornima Correa CNP   Frequency: QHS 10/29/20 - Until Discontinued Released by: Poornima Correa CNP 10/29/20 1109   Diagnoses  Insomnia [G47.00]   Outpatient Medication Detail     Disp Refills Start End TOM   citalopram (CELEXA) 10 MG tablet 90 tablet 3 10/29/2020  No   Sig - Route: Take 1 tablet (10 mg total) by mouth daily. Due for med check appointment - Oral   Sent to pharmacy as: citalopram 10 mg tablet (celeXA)   E-Prescribing Status: Receipt confirmed by pharmacy (10/29/2020 11:10 AM CDT)       citalopram (CELEXA) 10 MG tablet [[id number]]    Electronically signed by: Poornima Correa CNP on 10/29/20 1109 Status: Active   Ordering user: Poornima Correa CNP 10/29/20 1109 Authorized by: Poornima Correa CNP   Frequency: DAILY 10/29/20 - Until Discontinued Released by: Poornima Correa CNP 10/29/20 1109   Diagnoses  Generalized anxiety disorder [F41.1]       Routing refill request to provider for review/approval because:  Patient needs to be seen because it has been more than 1 year since last office visit.    Last office visit provider:  10/29/20     Requested Prescriptions   Pending Prescriptions Disp Refills     traZODone (DESYREL) 100 MG tablet [Pharmacy Med Name: traZODone HCl Oral Tablet 100 MG] 90 tablet 0     Sig: TAKE ONE TABLET BY MOUTH AT BEDTIME       Serotonin Modulators Failed - 1/15/2022  2:01 AM        Failed - Recent (12 mo) or future (30 days) visit within  "the authorizing provider's specialty     Patient has had an office visit with the authorizing provider or a provider within the authorizing providers department within the previous 12 mos or has a future within next 30 days. See \"Patient Info\" tab in inbasket, or \"Choose Columns\" in Meds & Orders section of the refill encounter.              Passed - Medication is active on med list        Passed - Patient is age 18 or older           citalopram (CELEXA) 10 MG tablet [Pharmacy Med Name: Citalopram Hydrobromide Oral Tablet 10 MG] 90 tablet 0     Sig: TAKE ONE TABLET BY MOUTH ONE TIME DAILY       SSRIs Protocol Failed - 1/15/2022  2:01 AM        Failed - Recent (12 mo) or future (30 days) visit within the authorizing provider's specialty     Patient has had an office visit with the authorizing provider or a provider within the authorizing providers department within the previous 12 mos or has a future within next 30 days. See \"Patient Info\" tab in inbasket, or \"Choose Columns\" in Meds & Orders section of the refill encounter.              Failed - Medication is active on med list        Passed - Patient is age 18 or older             Dontae Taylor RN 01/17/22 7:14 AM  "

## 2022-01-18 RX ORDER — CITALOPRAM HYDROBROMIDE 10 MG/1
TABLET ORAL
Qty: 90 TABLET | Refills: 0 | Status: SHIPPED | OUTPATIENT
Start: 2022-01-18 | End: 2022-02-16

## 2022-01-18 RX ORDER — TRAZODONE HYDROCHLORIDE 100 MG/1
TABLET ORAL
Qty: 90 TABLET | Refills: 0 | Status: SHIPPED | OUTPATIENT
Start: 2022-01-18 | End: 2022-01-21

## 2022-02-16 ENCOUNTER — OFFICE VISIT (OUTPATIENT)
Dept: FAMILY MEDICINE | Facility: CLINIC | Age: 72
End: 2022-02-16
Payer: COMMERCIAL

## 2022-02-16 VITALS
SYSTOLIC BLOOD PRESSURE: 136 MMHG | OXYGEN SATURATION: 97 % | WEIGHT: 185.3 LBS | DIASTOLIC BLOOD PRESSURE: 72 MMHG | BODY MASS INDEX: 26.53 KG/M2 | HEART RATE: 68 BPM | HEIGHT: 70 IN

## 2022-02-16 DIAGNOSIS — F51.04 PSYCHOPHYSIOLOGICAL INSOMNIA: ICD-10-CM

## 2022-02-16 DIAGNOSIS — L20.84 INTRINSIC ECZEMA: ICD-10-CM

## 2022-02-16 DIAGNOSIS — Z13.0 SCREENING FOR DEFICIENCY ANEMIA: ICD-10-CM

## 2022-02-16 DIAGNOSIS — R73.03 PRE-DIABETES: ICD-10-CM

## 2022-02-16 DIAGNOSIS — Z76.0 ENCOUNTER FOR MEDICATION REFILL: ICD-10-CM

## 2022-02-16 DIAGNOSIS — F41.1 GENERALIZED ANXIETY DISORDER: ICD-10-CM

## 2022-02-16 DIAGNOSIS — L82.0 INFLAMED SEBORRHEIC KERATOSIS: ICD-10-CM

## 2022-02-16 DIAGNOSIS — Z51.81 ENCOUNTER FOR THERAPEUTIC DRUG MONITORING: ICD-10-CM

## 2022-02-16 DIAGNOSIS — Z00.00 ENCOUNTER FOR MEDICARE ANNUAL WELLNESS EXAM: Primary | ICD-10-CM

## 2022-02-16 DIAGNOSIS — Z11.59 NEED FOR HEPATITIS C SCREENING TEST: ICD-10-CM

## 2022-02-16 DIAGNOSIS — Z12.5 SCREENING FOR PROSTATE CANCER: ICD-10-CM

## 2022-02-16 PROBLEM — Z86.0100 HISTORY OF COLONIC POLYPS: Status: ACTIVE | Noted: 2018-02-21

## 2022-02-16 PROBLEM — M19.012 PRIMARY OSTEOARTHRITIS OF LEFT SHOULDER: Status: ACTIVE | Noted: 2020-03-03

## 2022-02-16 PROBLEM — N39.0 CHRONIC UTI: Status: ACTIVE | Noted: 2019-08-14

## 2022-02-16 LAB
ALBUMIN SERPL-MCNC: 4.2 G/DL (ref 3.5–5)
ALP SERPL-CCNC: 89 U/L (ref 45–120)
ALT SERPL W P-5'-P-CCNC: 25 U/L (ref 0–45)
ANION GAP SERPL CALCULATED.3IONS-SCNC: 10 MMOL/L (ref 5–18)
AST SERPL W P-5'-P-CCNC: 25 U/L (ref 0–40)
BILIRUB SERPL-MCNC: 0.6 MG/DL (ref 0–1)
BUN SERPL-MCNC: 14 MG/DL (ref 8–28)
CALCIUM SERPL-MCNC: 9.7 MG/DL (ref 8.5–10.5)
CHLORIDE BLD-SCNC: 106 MMOL/L (ref 98–107)
CO2 SERPL-SCNC: 22 MMOL/L (ref 22–31)
CREAT SERPL-MCNC: 0.83 MG/DL (ref 0.7–1.3)
ERYTHROCYTE [DISTWIDTH] IN BLOOD BY AUTOMATED COUNT: 13.5 % (ref 10–15)
GFR SERPL CREATININE-BSD FRML MDRD: >90 ML/MIN/1.73M2
GLUCOSE BLD-MCNC: 102 MG/DL (ref 70–125)
HBA1C MFR BLD: 5.8 % (ref 0–5.6)
HCT VFR BLD AUTO: 42.5 % (ref 40–53)
HGB BLD-MCNC: 13.5 G/DL (ref 13.3–17.7)
MCH RBC QN AUTO: 29.7 PG (ref 26.5–33)
MCHC RBC AUTO-ENTMCNC: 31.8 G/DL (ref 31.5–36.5)
MCV RBC AUTO: 94 FL (ref 78–100)
PLATELET # BLD AUTO: 242 10E3/UL (ref 150–450)
POTASSIUM BLD-SCNC: 4.3 MMOL/L (ref 3.5–5)
PROT SERPL-MCNC: 7.3 G/DL (ref 6–8)
RBC # BLD AUTO: 4.54 10E6/UL (ref 4.4–5.9)
SODIUM SERPL-SCNC: 138 MMOL/L (ref 136–145)
WBC # BLD AUTO: 8.1 10E3/UL (ref 4–11)

## 2022-02-16 PROCEDURE — 93010 ELECTROCARDIOGRAM REPORT: CPT | Performed by: INTERNAL MEDICINE

## 2022-02-16 PROCEDURE — 17110 DESTRUCTION B9 LES UP TO 14: CPT | Performed by: NURSE PRACTITIONER

## 2022-02-16 PROCEDURE — 36415 COLL VENOUS BLD VENIPUNCTURE: CPT | Performed by: NURSE PRACTITIONER

## 2022-02-16 PROCEDURE — 85027 COMPLETE CBC AUTOMATED: CPT | Performed by: NURSE PRACTITIONER

## 2022-02-16 PROCEDURE — 99214 OFFICE O/P EST MOD 30 MIN: CPT | Mod: 25 | Performed by: NURSE PRACTITIONER

## 2022-02-16 PROCEDURE — 83036 HEMOGLOBIN GLYCOSYLATED A1C: CPT | Performed by: NURSE PRACTITIONER

## 2022-02-16 PROCEDURE — 80053 COMPREHEN METABOLIC PANEL: CPT | Performed by: NURSE PRACTITIONER

## 2022-02-16 PROCEDURE — 99397 PER PM REEVAL EST PAT 65+ YR: CPT | Mod: 25 | Performed by: NURSE PRACTITIONER

## 2022-02-16 PROCEDURE — 93005 ELECTROCARDIOGRAM TRACING: CPT | Performed by: NURSE PRACTITIONER

## 2022-02-16 RX ORDER — CITALOPRAM HYDROBROMIDE 10 MG/1
10 TABLET ORAL DAILY
Qty: 90 TABLET | Refills: 3 | Status: SHIPPED | OUTPATIENT
Start: 2022-02-16 | End: 2023-03-22

## 2022-02-16 RX ORDER — TRAZODONE HYDROCHLORIDE 100 MG/1
TABLET ORAL
Qty: 90 TABLET | Refills: 3 | Status: SHIPPED | OUTPATIENT
Start: 2022-02-16 | End: 2023-03-22

## 2022-02-16 RX ORDER — TAMSULOSIN HYDROCHLORIDE 0.4 MG/1
1 CAPSULE ORAL EVERY 24 HOURS
COMMUNITY
Start: 2020-04-03

## 2022-02-16 ASSESSMENT — ACTIVITIES OF DAILY LIVING (ADL): CURRENT_FUNCTION: NO ASSISTANCE NEEDED

## 2022-02-16 NOTE — PATIENT INSTRUCTIONS
Patient Education   Personalized Prevention Plan  You are due for the preventive services outlined below.  Your care team is available to assist you in scheduling these services.  If you have already completed any of these items, please share that information with your care team to update in your medical record.  Health Maintenance Due   Topic Date Due     ANNUAL REVIEW OF HM ORDERS  Never done     Discuss Advance Care Planning  Never done     Depression Action Plan  Never done     COVID-19 Vaccine (1) Never done     Hepatitis C Screening  Never done     Pneumococcal Vaccine (1 of 1 - PPSV23) Never done     AORTIC ANEURYSM SCREENING (SYSTEM ASSIGNED)  Never done     Zoster (Shingles) Vaccine (2 of 2) 06/16/2016     Flu Vaccine (1) Never done

## 2022-02-16 NOTE — LETTER
February 22, 2022      Baltazar Kruse  [address] [facility] [id number]        Dear ,    We are writing to inform you of your test results.    Baltazar,     Your EKG looked normal.  Diabetes screen is stable in the pre-diabetes range.  This has not increased which is good news.  All other labs are normal.  Please let me know if you have any questions,     Poornima Manzano   Hemoglobin A1c   Result Value Ref Range    Hemoglobin A1C 5.8 (H) 0.0 - 5.6 %      Comment:      Normal <5.7%   Prediabetes 5.7-6.4%    Diabetes 6.5% or higher     Note: Adopted from ADA consensus guidelines.   Comprehensive metabolic panel (BMP + Alb, Alk Phos, ALT, AST, Total. Bili, TP)   Result Value Ref Range    Sodium 138 136 - 145 mmol/L    Potassium 4.3 3.5 - 5.0 mmol/L    Chloride 106 98 - 107 mmol/L    Carbon Dioxide (CO2) 22 22 - 31 mmol/L    Anion Gap 10 5 - 18 mmol/L    Urea Nitrogen 14 8 - 28 mg/dL    Creatinine 0.83 0.70 - 1.30 mg/dL    Calcium 9.7 8.5 - 10.5 mg/dL    Glucose 102 70 - 125 mg/dL    Alkaline Phosphatase 89 45 - 120 U/L    AST 25 0 - 40 U/L    ALT 25 0 - 45 U/L    Protein Total 7.3 6.0 - 8.0 g/dL    Albumin 4.2 3.5 - 5.0 g/dL    Bilirubin Total 0.6 0.0 - 1.0 mg/dL    GFR Estimate >90 >60 mL/min/1.73m2      Comment:      Effective December 21, 2021 eGFRcr in adults is calculated using the 2021 CKD-EPI creatinine equation which includes age and gender ([place] et al., NEJM, DOI: 10.1056/UKXNvj6802581)   CBC with platelets   Result Value Ref Range    WBC Count 8.1 4.0 - 11.0 10e3/uL    RBC Count 4.54 4.40 - 5.90 10e6/uL    Hemoglobin 13.5 13.3 - 17.7 g/dL    Hematocrit 42.5 40.0 - 53.0 %    MCV 94 78 - 100 fL    MCH 29.7 26.5 - 33.0 pg    MCHC 31.8 31.5 - 36.5 g/dL    RDW 13.5 10.0 - 15.0 %    Platelet Count 242 150 - 450 10e3/uL       If you have any questions or concerns, please call the clinic at the number listed above.       Sincerely,      Poornima Correa CNP

## 2022-02-16 NOTE — PROGRESS NOTES
"SUBJECTIVE:   Artemio Kruse is a 71 year old male who presents for Preventive Visit.    He states he has been well. His only concern today is what he feels is eczema on his back. Persistently pruritic.  No issues elsewhere on body.     JOHN:  On citalopram 10mg as well as trazodone for sleep.  CBD sleep capsule.  Feels well controlled with this regimen.     Eczema on back:  Itchy.  Not using daily emollient.      When exhaling, right nostril whistles.  No congestion or pressure. Past three months.     Patient has been advised of split billing requirements and indicates understanding: Yes  Are you in the first 12 months of your Medicare coverage?  No    Healthy Habits:     In general, how would you rate your overall health?  Excellent    Frequency of exercise:  6-7 days/week    Duration of exercise:  Greater than 60 minutes    Do you usually eat at least 4 servings of fruit and vegetables a day, include whole grains    & fiber and avoid regularly eating high fat or \"junk\" foods?  No    Taking medications regularly:  Yes    Medication side effects:  Not applicable and None    Ability to successfully perform activities of daily living:  No assistance needed    Home Safety:  No safety concerns identified    Hearing Impairment:  No hearing concerns    In the past 6 months, have you been bothered by leaking of urine?  No    In general, how would you rate your overall mental or emotional health?  Good      PHQ-2 Total Score: 0    Additional concerns today:  No    Do you feel safe in your environment? Yes    Have you ever done Advance Care Planning? (For example, a Health Directive, POLST, or a discussion with a medical provider or your loved ones about your wishes): No, advance care planning information given to patient to review.  Patient declined advance care planning discussion at this time.       Fall risk  Fallen 2 or more times in the past year?: No  Any fall with injury in the past year?: No    Cognitive " "Screening   1) Repeat 3 items (Leader, Season, Table)    2) Clock draw: NORMAL  3) 3 item recall: Recalls 2 objects   Results: NORMAL clock, 1-2 items recalled: COGNITIVE IMPAIRMENT LESS LIKELY    Mini-CogTM Copyright S Ronnie. Licensed by the author for use in [facility]; reprinted with permission (abhi@[facility]). All rights reserved.      Do you have sleep apnea, excessive snoring or daytime drowsiness?: yes    Reviewed and updated as needed this visit by clinical staff   Tobacco  Allergies  Meds              Reviewed and updated as needed this visit by Provider                 Social History     Tobacco Use     Smoking status: Never Smoker     Smokeless tobacco: Never Used   Substance Use Topics     Alcohol use: Yes     Alcohol/week: 3.3 standard drinks     Comment: Alcoholic Drinks/day: 4 beer bottle size hard cider per week         Alcohol Use 2/16/2022   Prescreen: >3 drinks/day or >7 drinks/week? No               Current providers sharing in care for this patient include:   Patient Care Team:  Poornima Correa CNP as PCP - General (Orthothist)  Poornima Correa CNP as Assigned PCP    The following health maintenance items are reviewed in Epic and correct as of today:  Health Maintenance Due   Topic Date Due     ANNUAL REVIEW OF HM ORDERS  Never done     ADVANCE CARE PLANNING  Never done     DEPRESSION ACTION PLAN  Never done     COVID-19 Vaccine (1) Never done     HEPATITIS C SCREENING  Never done     Pneumococcal Vaccine: 65+ Years (1 of 1 - PPSV23) Never done     AORTIC ANEURYSM SCREENING (SYSTEM ASSIGNED)  Never done     ZOSTER IMMUNIZATION (2 of 2) 06/16/2016     INFLUENZA VACCINE (1) Never done     Lab work is in process          Review of Systems  Constitutional, HEENT, cardiovascular, pulmonary, gi and gu systems are negative, except as otherwise noted.    OBJECTIVE:   /72 (BP Location: Left arm, Patient Position: Sitting, Cuff Size: Adult Regular)   Pulse 68   Ht 1.778 m (5' 10\")   " "Wt 84.1 kg (185 lb 4.8 oz)   SpO2 97%   BMI 26.59 kg/m   Estimated body mass index is 26.59 kg/m  as calculated from the following:    Height as of this encounter: 1.778 m (5' 10\").    Weight as of this encounter: 84.1 kg (185 lb 4.8 oz).  Physical Exam  GENERAL: healthy, alert and no distress  EYES: Eyes grossly normal to inspection, PERRL and conjunctivae and sclerae normal  HENT: ear canals and TM's normal, nose and mouth without ulcers or lesions  NECK: no adenopathy, no asymmetry, masses, or scars and thyroid normal to palpation  RESP: lungs clear to auscultation - no rales, rhonchi or wheezes  CV: regular rate and rhythm, normal S1 S2, no S3 or S4, no murmur, click or rub, no peripheral edema and peripheral pulses strong  ABDOMEN: soft, nontender, no hepatosplenomegaly, no masses and bowel sounds normal  MS: no gross musculoskeletal defects noted, no edema  SKIN: Back with many SKs. 7 appear irritated and scaling.  Treated with cryotherapy, three freeze-thaw cycles. He also has some smaller patches of scaling consistent with eczema  NEURO: Normal strength and tone, mentation intact and speech normal  PSYCH: mentation appears normal, affect normal/bright    Diagnostic Test Results:  Labs reviewed in Epic    ASSESSMENT / PLAN:   1. Encounter for Medicare annual wellness exam  Non-fasting labs.  Counseled on pneumonia and COVID vaccines, he states he will discuss these with his wife. Up to date on CRC screening.      2. Generalized anxiety disorder  Feels anxiety well controlled with citalopram and trazodone. Annual monitoring EKG today for increased risk of QT prolongation with this combination - this was normal.    - citalopram (CELEXA) 10 MG tablet; Take 1 tablet (10 mg) by mouth daily  Dispense: 90 tablet; Refill: 3  - traZODone (DESYREL) 100 MG tablet; TAKE ONE TABLET BY MOUTH AT BEDTIME  Dispense: 90 tablet; Refill: 3    3. Psychophysiological insomnia  - traZODone (DESYREL) 100 MG tablet; TAKE ONE TABLET " "BY MOUTH AT BEDTIME  Dispense: 90 tablet; Refill: 3    4. Inflamed seborrheic keratosis  Seven inflamed SKs on back treated with cryotherapy. Discussed these are likely at least partially responsible for his pruritis.      5. Intrinsic eczema  - triamcinolone (KENALOG) 0.5 % external ointment; Apply twice daily to eczematous patches for up to two weeks  Dispense: 60 g; Refill: 1    6. Pre-diabetes  Counseled on exercise and diet. Recheck lab.   - Hemoglobin A1c; Future  - Comprehensive metabolic panel (BMP + Alb, Alk Phos, ALT, AST, Total. Bili, TP); Future  - Hemoglobin A1c  - Comprehensive metabolic panel (BMP + Alb, Alk Phos, ALT, AST, Total. Bili, TP)    7. Need for hepatitis C screening test    8. Encounter for therapeutic drug monitoring  - EKG 12-lead, tracing only    9. Encounter for medication refill    10. Screening for deficiency anemia  - CBC with platelets; Future  - CBC with platelets    11. Screening for prostate cancer    Patient has been advised of split billing requirements and indicates understanding: Yes    COUNSELING:  Reviewed preventive health counseling, as reflected in patient instructions    Estimated body mass index is 26.59 kg/m  as calculated from the following:    Height as of this encounter: 1.778 m (5' 10\").    Weight as of this encounter: 84.1 kg (185 lb 4.8 oz).    Weight management plan: Discussed healthy diet and exercise guidelines    He reports that he has never smoked. He has never used smokeless tobacco.      Appropriate preventive services were discussed with this patient, including applicable screening as appropriate for cardiovascular disease, diabetes, osteopenia/osteoporosis, and glaucoma.  As appropriate for age/gender, discussed screening for colorectal cancer, prostate cancer, breast cancer, and cervical cancer. Checklist reviewing preventive services available has been given to the patient.    Reviewed patients plan of care and provided an AVS. The Basic Care Plan " (routine screening as documented in Health Maintenance) for Artemio meets the Care Plan requirement. This Care Plan has been established and reviewed with the Patient.    Counseling Resources:  ATP IV Guidelines  Pooled Cohorts Equation Calculator  Breast Cancer Risk Calculator  Breast Cancer: Medication to Reduce Risk  FRAX Risk Assessment  ICSI Preventive Guidelines  Dietary Guidelines for Americans, 2010  [employer]'s MyPlate  ASA Prophylaxis  Lung CA Screening    Poornima Correa [facility]    Identified Health Risks:

## 2022-02-17 LAB
ATRIAL RATE - MUSE: 64 BPM
DIASTOLIC BLOOD PRESSURE - MUSE: NORMAL MMHG
INTERPRETATION ECG - MUSE: NORMAL
P AXIS - MUSE: 59 DEGREES
PR INTERVAL - MUSE: 136 MS
QRS DURATION - MUSE: 92 MS
QT - MUSE: 400 MS
QTC - MUSE: 412 MS
R AXIS - MUSE: 22 DEGREES
SYSTOLIC BLOOD PRESSURE - MUSE: NORMAL MMHG
T AXIS - MUSE: 64 DEGREES
VENTRICULAR RATE- MUSE: 64 BPM

## 2022-02-17 RX ORDER — TRIAMCINOLONE ACETONIDE 5 MG/G
OINTMENT TOPICAL
Qty: 60 G | Refills: 1 | Status: SHIPPED | OUTPATIENT
Start: 2022-02-17

## 2022-05-10 ENCOUNTER — TRANSFERRED RECORDS (OUTPATIENT)
Dept: HEALTH INFORMATION MANAGEMENT | Facility: CLINIC | Age: 72
End: 2022-05-10
Payer: COMMERCIAL

## 2023-03-17 DIAGNOSIS — F41.1 GENERALIZED ANXIETY DISORDER: ICD-10-CM

## 2023-03-17 DIAGNOSIS — F51.04 PSYCHOPHYSIOLOGICAL INSOMNIA: ICD-10-CM

## 2023-03-17 NOTE — TELEPHONE ENCOUNTER
"Former patient of Poornima Correa & has not established care with another provider.  Please assign refill request to covering provider per clinic standard process.      Routing refill request to provider for review/approval because:  Patient needs to be seen because it has been more than 1 year since last office visit.    Last Written Prescription Date:  2/16/2022  Last Fill Quantity: 90,  # refills: 3   Last office visit provider:  2/16/2022     Last Written Prescription Date:  2/16/2022  Last Fill Quantity: 90,  # refills: 3   Last office visit: 2/16/2022    Future Office Visit:  None scheduled    Requested Prescriptions   Pending Prescriptions Disp Refills     citalopram (CELEXA) 10 MG tablet [Pharmacy Med Name: Citalopram Hydrobromide Oral Tablet 10 MG] 90 tablet 0     Sig: Take 1 tablet (10 mg) by mouth daily       SSRIs Protocol Failed - 3/17/2023  2:01 AM        Failed - Recent (12 mo) or future (30 days) visit within the authorizing provider's specialty     Patient has had an office visit with the authorizing provider or a provider within the authorizing providers department within the previous 12 mos or has a future within next 30 days. See \"Patient Info\" tab in inbasket, or \"Choose Columns\" in Meds & Orders section of the refill encounter.              Passed - Medication is active on med list        Passed - Patient is age 18 or older           traZODone (DESYREL) 100 MG tablet [Pharmacy Med Name: traZODone HCl Oral Tablet 100 MG] 90 tablet 0     Sig: TAKE ONE TABLET BY MOUTH AT BEDTIME       Serotonin Modulators Failed - 3/17/2023  2:01 AM        Failed - Recent (12 mo) or future (30 days) visit within the authorizing provider's specialty     Patient has had an office visit with the authorizing provider or a provider within the authorizing providers department within the previous 12 mos or has a future within next 30 days. See \"Patient Info\" tab in inbasket, or \"Choose Columns\" in Meds & Orders section of " the refill encounter.              Passed - Medication is active on med list        Passed - Patient is age 18 or older             THAIS CARMEN RN 03/17/23 1:11 PM

## 2023-03-22 RX ORDER — TRAZODONE HYDROCHLORIDE 100 MG/1
TABLET ORAL
Qty: 90 TABLET | Refills: 0 | Status: SHIPPED | OUTPATIENT
Start: 2023-03-22 | End: 2023-10-06

## 2023-03-22 RX ORDER — CITALOPRAM HYDROBROMIDE 10 MG/1
10 TABLET ORAL DAILY
Qty: 90 TABLET | Refills: 0 | Status: SHIPPED | OUTPATIENT
Start: 2023-03-22 | End: 2023-09-08

## 2023-09-08 ENCOUNTER — TELEPHONE (OUTPATIENT)
Dept: PEDIATRICS | Facility: CLINIC | Age: 73
End: 2023-09-08
Payer: COMMERCIAL

## 2023-09-08 DIAGNOSIS — F41.1 GENERALIZED ANXIETY DISORDER: ICD-10-CM

## 2023-09-08 RX ORDER — CITALOPRAM HYDROBROMIDE 10 MG/1
10 TABLET ORAL DAILY
Qty: 30 TABLET | Refills: 0 | Status: SHIPPED | OUTPATIENT
Start: 2023-09-08 | End: 2023-10-06

## 2023-09-08 NOTE — TELEPHONE ENCOUNTER
Pt has appointment 10-6-23    He will not have enough citalopram (CELEXA) 10 MG tablet  to make it tell his appointment.

## 2023-09-08 NOTE — TELEPHONE ENCOUNTER
Pt's wife called   Pt only has 4 tablets left    Dr. Tsang,    Pt has not seen you yet. But is requesting a refill of his citalopram     LOV at the [facility] was 2/16/22- Pt's provider left a year ago    Advised to wife that we may not be able to refill this medication  Appt with you is 10/6/23    Thank you  Juan Curry RN on 9/8/2023 at 3:30 PM

## 2023-10-06 ENCOUNTER — OFFICE VISIT (OUTPATIENT)
Dept: PEDIATRICS | Facility: CLINIC | Age: 73
End: 2023-10-06
Payer: COMMERCIAL

## 2023-10-06 VITALS
TEMPERATURE: 98.2 F | DIASTOLIC BLOOD PRESSURE: 60 MMHG | BODY MASS INDEX: 28.02 KG/M2 | HEART RATE: 80 BPM | HEIGHT: 70 IN | OXYGEN SATURATION: 96 % | SYSTOLIC BLOOD PRESSURE: 142 MMHG | WEIGHT: 195.7 LBS

## 2023-10-06 DIAGNOSIS — R03.0 ELEVATED BLOOD PRESSURE READING WITHOUT DIAGNOSIS OF HYPERTENSION: ICD-10-CM

## 2023-10-06 DIAGNOSIS — F51.04 PSYCHOPHYSIOLOGICAL INSOMNIA: ICD-10-CM

## 2023-10-06 DIAGNOSIS — F41.1 GENERALIZED ANXIETY DISORDER: ICD-10-CM

## 2023-10-06 DIAGNOSIS — Z11.59 NEED FOR HEPATITIS C SCREENING TEST: ICD-10-CM

## 2023-10-06 DIAGNOSIS — Z00.00 ENCOUNTER FOR ANNUAL WELLNESS EXAM IN MEDICARE PATIENT: Primary | ICD-10-CM

## 2023-10-06 DIAGNOSIS — K21.00 GASTROESOPHAGEAL REFLUX DISEASE WITH ESOPHAGITIS WITHOUT HEMORRHAGE: ICD-10-CM

## 2023-10-06 PROCEDURE — 99214 OFFICE O/P EST MOD 30 MIN: CPT | Mod: 25 | Performed by: INTERNAL MEDICINE

## 2023-10-06 PROCEDURE — G0439 PPPS, SUBSEQ VISIT: HCPCS | Performed by: INTERNAL MEDICINE

## 2023-10-06 RX ORDER — CITALOPRAM HYDROBROMIDE 10 MG/1
10 TABLET ORAL DAILY
Qty: 90 TABLET | Refills: 3 | Status: SHIPPED | OUTPATIENT
Start: 2023-10-06

## 2023-10-06 RX ORDER — TRAZODONE HYDROCHLORIDE 100 MG/1
100 TABLET ORAL AT BEDTIME
Qty: 90 TABLET | Refills: 3 | Status: SHIPPED | OUTPATIENT
Start: 2023-10-06 | End: 2024-09-26

## 2023-10-06 ASSESSMENT — ACTIVITIES OF DAILY LIVING (ADL): CURRENT_FUNCTION: NO ASSISTANCE NEEDED

## 2023-10-06 ASSESSMENT — ENCOUNTER SYMPTOMS
COUGH: 0
HEADACHES: 0
FEVER: 0
FREQUENCY: 0
EYE PAIN: 0
WEAKNESS: 0
HEARTBURN: 1
SHORTNESS OF BREATH: 0
MYALGIAS: 0
DIZZINESS: 0
JOINT SWELLING: 0
SORE THROAT: 0
DIARRHEA: 0
CHILLS: 0
HEMATOCHEZIA: 0
ARTHRALGIAS: 0
NERVOUS/ANXIOUS: 0
HEMATURIA: 0
CONSTIPATION: 0
ABDOMINAL PAIN: 0
PARESTHESIAS: 0
NAUSEA: 0
DYSURIA: 0
PALPITATIONS: 0

## 2023-10-06 ASSESSMENT — PATIENT HEALTH QUESTIONNAIRE - PHQ9
10. IF YOU CHECKED OFF ANY PROBLEMS, HOW DIFFICULT HAVE THESE PROBLEMS MADE IT FOR YOU TO DO YOUR WORK, TAKE CARE OF THINGS AT HOME, OR GET ALONG WITH OTHER PEOPLE: NOT DIFFICULT AT ALL
SUM OF ALL RESPONSES TO PHQ QUESTIONS 1-9: 4
SUM OF ALL RESPONSES TO PHQ QUESTIONS 1-9: 4

## 2023-10-06 NOTE — PATIENT INSTRUCTIONS
"Colonoscopy in 2028.    Follow with the urologist for your prostate meds.    Check your blood pressure at home.    If 140 / 90 or higher, we should consider blood pressure meds.  Send me a [employer] message    Set up a fasting blood draw.    Consider the following:  Shingrix #1 and #2,  COVID series  Flu shot  Prevnar 20    With respect to your gastroesophageal reflux disease;  Avoid alcohol, caffeine, tobacco, spicy foods, citrus foods, aspirin and anti-inflammatories like ibuprofen (\"Advil\" and \"Motrin\") or naproxen (\"Aleve\").     May try over-the-counter prilosec (omeprazole) 20 mg every AM for 1 month.  If still not better, let me know and we can order an upper scope.    Lenin Tsang MD  Internal Medicine and Pediatrics       Refills given today.        Patient Education   Personalized Prevention Plan  You are due for the preventive services outlined below.  Your care team is available to assist you in scheduling these services.  If you have already completed any of these items, please share that information with your care team to update in your medical record.  Health Maintenance Due   Topic Date Due    Depression Action Plan  Never done    COVID-19 Vaccine (1) Never done    Hepatitis C Screening  Never done    Pneumococcal Vaccine (1 - PCV) Never done    Zoster (Shingles) Vaccine (2 of 2) 06/16/2016    Annual Wellness Visit  02/16/2023    ANNUAL REVIEW OF HM ORDERS  02/16/2023    Flu Vaccine (1) Never done     Learning About Dietary Guidelines  What are the Dietary Guidelines for Americans?     Dietary Guidelines for Americans provide tips for eating well and staying healthy. This helps reduce the risk for long-term (chronic) diseases.  These guidelines recommend that you:  Eat and drink the right amount for you. The U.S. government's food guide is called MyPlate. It can help you make your own well-balanced eating plan.  Try to balance your eating with your activity. This helps you stay at a healthy " "weight.  Drink alcohol in moderation, if at all.  Limit foods high in salt, saturated fat, trans fat, and added sugar.  These guidelines are from the U.S. Department of Agriculture and the U.S. Department of Health and Human Services. They are updated every 5 years.  What is MyPlate?  MyPlate is the U.S. government's food guide. It can help you make your own well-balanced eating plan. A balanced eating plan means that you eat enough, but not too much, and that your food gives you the nutrients you need to stay healthy.  MyPlate focuses on eating plenty of whole grains, fruits, and vegetables, and on limiting fat and sugar. It is available online at www.ChooseMyPlate.gov.  How can you get started?  If you're trying to eat healthier, you can slowly change your eating habits over time. You don't have to make big changes all at once. Start by adding one or two healthy foods to your meals each day.  Grains  Choose whole-grain breads and cereals and whole-wheat pasta and whole-grain crackers.  Vegetables  Eat a variety of vegetables every day. They have lots of nutrients and are part of a heart-healthy diet.  Fruits  Eat a variety of fruits every day. Fruits contain lots of nutrients. Choose fresh fruit instead of fruit juice.  Protein foods  Choose fish and lean poultry more often. Eat red meat and fried meats less often. Dried beans, tofu, and nuts are also good sources of protein.  Dairy  Choose low-fat or fat-free products from this food group. If you have problems digesting milk, try eating cheese or yogurt instead.  Fats and oils  Limit fats and oils if you're trying to cut calories. Choose healthy fats when you cook. These include canola oil and olive oil.  Where can you learn more?  Go to https://www.healthGetSet.net/patiented  Enter D676 in the search box to learn more about \"Learning About Dietary Guidelines.\"  Current as of: March 1, 2023               Content Version: 13.7    5396-5725 [employer]. "   Care instructions adapted under license by your healthcare professional. If you have questions about a medical condition or this instruction, always ask your healthcare professional. [employer] disclaims any warranty or liability for your use of this information.

## 2023-10-06 NOTE — PROGRESS NOTES
"SUBJECTIVE:   Baltazar is a 73 year old who presents for Preventive Visit.    Are you in the first 12 months of your Medicare coverage?  No    Healthy Habits:     In general, how would you rate your overall health?  Good    Frequency of exercise:  6-7 days/week    Duration of exercise:  45-60 minutes    Do you usually eat at least 4 servings of fruit and vegetables a day, include whole grains    & fiber and avoid regularly eating high fat or \"junk\" foods?  No    Taking medications regularly:  Yes    Medication side effects:  Not applicable    Ability to successfully perform activities of daily living:  No assistance needed    Home Safety:  Throw rugs in the hallway and lack of grab bars in the bathroom    Hearing Impairment:  No hearing concerns    In the past 6 months, have you been bothered by leaking of urine?  No    In general, how would you rate your overall mental or emotional health?  Good    Additional concerns today:  Yes      Today's PHQ-9 Score:       10/6/2023     1:44 PM   PHQ-9 SCORE   PHQ-9 Total Score MyChart 4 (Minimal depression)   PHQ-9 Total Score 4       Have you ever done Advance Care Planning? (For example, a Health Directive, POLST, or a discussion with a medical provider or your loved ones about your wishes): Yes, patient states has an Advance Care Planning document and will bring a copy to the clinic.       Fall risk  Fallen 2 or more times in the past year?: No  Any fall with injury in the past year?: No    Cognitive Screening   1) Repeat 3 items (Leader, Season, Table)    2) Clock draw: NORMAL  3) 3 item recall: Recalls 2 objects   Results: 3 items recalled: COGNITIVE IMPAIRMENT LESS LIKELY    Mild gastroesophageal reflux disease.    Knee joint problems around kneecaps.     Slight weight gain        Mini-CogTM Copyright S Ronnie. Licensed by the author for use in [facility]; reprinted with permission (abhi@.[facility]). All rights reserved.      Do you have sleep apnea, excessive " snoring or daytime drowsiness? : no    Reviewed and updated as needed this visit by clinical staff   Tobacco  Allergies  Meds              Reviewed and updated as needed this visit by Provider                 Social History     Tobacco Use    Smoking status: Never    Smokeless tobacco: Never   Substance Use Topics    Alcohol use: Yes     Alcohol/week: 3.3 standard drinks of alcohol     Comment: Alcoholic Drinks/day: 4 beer bottle size hard cider per week             10/6/2023     1:47 PM   Alcohol Use   Prescreen: >3 drinks/day or >7 drinks/week? No     Do you have a current opioid prescription? No  Do you use any other controlled substances or medications that are not prescribed by a provider? None              Current providers sharing in care for this patient include:   Patient Care Team:  No Ref-Primary, Physician as PCP - General    The following health maintenance items are reviewed in Epic and correct as of today:  Health Maintenance   Topic Date Due    DEPRESSION ACTION PLAN  Never done    COVID-19 Vaccine (1) Never done    HEPATITIS C SCREENING  Never done    Pneumococcal Vaccine: 65+ Years (1 - PCV) Never done    ZOSTER IMMUNIZATION (2 of 2) 06/16/2016    MEDICARE ANNUAL WELLNESS VISIT  02/16/2023    ANNUAL REVIEW OF HM ORDERS  02/16/2023    INFLUENZA VACCINE (1) Never done    PHQ-9  04/06/2024    FALL RISK ASSESSMENT  10/06/2024    LIPID  10/29/2025    ADVANCE CARE PLANNING  02/17/2027    DTAP/TDAP/TD IMMUNIZATION (5 - Td or Tdap) 05/01/2027    COLORECTAL CANCER SCREENING  11/17/2031    IPV IMMUNIZATION  Aged Out    HPV IMMUNIZATION  Aged Out    MENINGITIS IMMUNIZATION  Aged Out    AORTIC ANEURYSM SCREENING (SYSTEM ASSIGNED)  Discontinued     Lab work is in process  Labs reviewed in EPIC          Review of Systems   Constitutional:  Negative for chills and fever.   HENT:  Positive for congestion. Negative for ear pain, hearing loss and sore throat.    Eyes:  Negative for pain and visual disturbance.  "  Respiratory:  Negative for cough and shortness of breath.    Cardiovascular:  Negative for chest pain, palpitations and peripheral edema.   Gastrointestinal:  Positive for heartburn. Negative for abdominal pain, constipation, diarrhea, hematochezia and nausea.   Genitourinary:  Negative for dysuria, frequency, genital sores, hematuria, impotence, penile discharge and urgency.   Musculoskeletal:  Negative for arthralgias, joint swelling and myalgias.   Skin:  Negative for rash.   Neurological:  Negative for dizziness, weakness, headaches and paresthesias.   Psychiatric/Behavioral:  Negative for mood changes. The patient is not nervous/anxious.      CONSTITUTIONAL: NEGATIVE for fever, chills, change in weight  INTEGUMENTARY/SKIN: NEGATIVE for worrisome rashes, moles or lesions  EYES: NEGATIVE for vision changes or irritation  ENT/MOUTH: NEGATIVE for ear, mouth and throat problems  RESP: NEGATIVE for significant cough or SOB  BREAST: NEGATIVE for masses, tenderness or discharge  CV: NEGATIVE for chest pain, palpitations or peripheral edema  GI: NEGATIVE for nausea, abdominal pain, heartburn, or change in bowel habits  : NEGATIVE for frequency, dysuria, or hematuria  MUSCULOSKELETAL: NEGATIVE for significant arthralgias or myalgia  NEURO: NEGATIVE for weakness, dizziness or paresthesias  ENDOCRINE: NEGATIVE for temperature intolerance, skin/hair changes  HEME: NEGATIVE for bleeding problems  PSYCHIATRIC: NEGATIVE for changes in mood or affect    OBJECTIVE:   BP (!) 142/60 (BP Location: Right arm, Patient Position: Sitting, Cuff Size: Adult Regular)   Pulse 80   Temp 98.2  F (36.8  C) (Temporal)   Ht 1.778 m (5' 10\")   Wt 88.8 kg (195 lb 11.2 oz)   SpO2 96%   BMI 28.08 kg/m   Estimated body mass index is 28.08 kg/m  as calculated from the following:    Height as of this encounter: 1.778 m (5' 10\").    Weight as of this encounter: 88.8 kg (195 lb 11.2 oz).  Physical Exam  GENERAL: healthy, alert and no " distress  EYES: Eyes grossly normal to inspection, PERRL and conjunctivae and sclerae normal  HENT: ear canals and TM's normal, nose and mouth without ulcers or lesions  NECK: no adenopathy, no asymmetry, masses, or scars and thyroid normal to palpation  RESP: lungs clear to auscultation - no rales, rhonchi or wheezes  CV: regular rate and rhythm, normal S1 S2, no S3 or S4, no murmur, click or rub, no peripheral edema and peripheral pulses strong  ABDOMEN: soft, nontender, no hepatosplenomegaly, no masses and bowel sounds normal   (male): normal male genitalia without lesions or urethral discharge, no hernia  MS: no gross musculoskeletal defects noted, no edema  SKIN: no suspicious lesions or rashes  NEURO: Normal strength and tone, mentation intact and speech normal  PSYCH: mentation appears normal, affect normal/bright    Diagnostic Test Results:  Labs reviewed in Epic    ASSESSMENT / PLAN:   (Z00.00) Encounter for annual wellness exam in Medicare patient  (primary encounter diagnosis)  Comment:   Plan: Lipid panel reflex to direct LDL Fasting,         Comprehensive metabolic panel (BMP + Alb, Alk         Phos, ALT, AST, Total. Bili, TP), Hemoglobin         A1c        Discussed diet, exercise, testicular self exam, blood pressure, cholesterol, and need for cancer surveillance at appropriate ages.     (Z11.59) Need for hepatitis C screening test  Comment:   Plan:     (F41.1) Generalized anxiety disorder  Comment:   Plan: citalopram (CELEXA) 10 MG tablet, traZODone         (DESYREL) 100 MG tablet, OFFICE/OUTPT         VISIT,EST,LEVL III        Doing well.    (F51.04) Psychophysiological insomnia  Comment:   Plan: traZODone (DESYREL) 100 MG tablet, OFFICE/OUTPT        VISIT,EST,LEVL III        Ongoing trazodone.  Doing well.     (K21.00) Gastroesophageal reflux disease with esophagitis without hemorrhage  Comment:   Plan: OFFICE/OUTPT VISIT,EST,LEVL III        Avoid alcohol, caffeine, tobacco, spicy foods, citrus  "foods, aspirin and anti-inflammatories like ibuprofen (\"Advil\" and \"Motrin\") or naproxen (\"Aleve\"). Trial of proton pump inhibitor.    (R03.0) Elevated blood pressure reading without diagnosis of hypertension  Comment:   Plan: OFFICE/OUTPT VISIT,EST,LEVL III        To send us blood pressure readings from home; consider treatment if above 140/90    Patient has been advised of split billing requirements and indicates understanding: Yes      COUNSELING:  Reviewed preventive health counseling, as reflected in patient instructions       Regular exercise       Healthy diet/nutrition       Vision screening       Hearing screening       Alcohol Use        Folic Acid       Colon cancer screening       Prostate cancer screening        He reports that he has never smoked. He has never used smokeless tobacco.      Appropriate preventive services were discussed with this patient, including applicable screening as appropriate for fall prevention, nutrition, physical activity, Tobacco-use cessation, weight loss and cognition.  Checklist reviewing preventive services available has been given to the patient.    Reviewed patients plan of care and provided an AVS. The Basic Care Plan (routine screening as documented in Health Maintenance) for Artemio meets the Care Plan requirement. This Care Plan has been established and reviewed with the Patient.          Lenin Tsang MD  [facility]    Identified Health Risks:  I have reviewed Opioid Use Disorder and Substance Use Disorder risk factors and made any needed referrals. The patient was counseled and encouraged to consider modifying their diet and eating habits. He was provided with information on recommended healthy diet options.  "

## 2023-10-10 ENCOUNTER — LAB (OUTPATIENT)
Dept: LAB | Facility: CLINIC | Age: 73
End: 2023-10-10
Payer: COMMERCIAL

## 2023-10-10 DIAGNOSIS — Z00.00 ENCOUNTER FOR ANNUAL WELLNESS EXAM IN MEDICARE PATIENT: ICD-10-CM

## 2023-10-10 LAB
ALBUMIN SERPL BCG-MCNC: 4.4 G/DL (ref 3.5–5.2)
ALP SERPL-CCNC: 106 U/L (ref 40–129)
ALT SERPL W P-5'-P-CCNC: 27 U/L (ref 0–70)
ANION GAP SERPL CALCULATED.3IONS-SCNC: 13 MMOL/L (ref 7–15)
AST SERPL W P-5'-P-CCNC: 25 U/L (ref 0–45)
BILIRUB SERPL-MCNC: 0.6 MG/DL
BUN SERPL-MCNC: 12.3 MG/DL (ref 8–23)
CALCIUM SERPL-MCNC: 9.8 MG/DL (ref 8.8–10.2)
CHLORIDE SERPL-SCNC: 102 MMOL/L (ref 98–107)
CHOLEST SERPL-MCNC: 173 MG/DL
CREAT SERPL-MCNC: 0.95 MG/DL (ref 0.67–1.17)
DEPRECATED HCO3 PLAS-SCNC: 24 MMOL/L (ref 22–29)
EGFRCR SERPLBLD CKD-EPI 2021: 85 ML/MIN/1.73M2
GLUCOSE SERPL-MCNC: 123 MG/DL (ref 70–99)
HBA1C MFR BLD: 5.8 % (ref 0–5.6)
HDLC SERPL-MCNC: 67 MG/DL
LDLC SERPL CALC-MCNC: 90 MG/DL
NONHDLC SERPL-MCNC: 106 MG/DL
POTASSIUM SERPL-SCNC: 4.4 MMOL/L (ref 3.4–5.3)
PROT SERPL-MCNC: 7.5 G/DL (ref 6.4–8.3)
SODIUM SERPL-SCNC: 139 MMOL/L (ref 135–145)
TRIGL SERPL-MCNC: 82 MG/DL

## 2023-10-10 PROCEDURE — 80053 COMPREHEN METABOLIC PANEL: CPT

## 2023-10-10 PROCEDURE — 80061 LIPID PANEL: CPT

## 2023-10-10 PROCEDURE — 83036 HEMOGLOBIN GLYCOSYLATED A1C: CPT

## 2023-10-10 PROCEDURE — 36415 COLL VENOUS BLD VENIPUNCTURE: CPT

## 2023-10-26 ENCOUNTER — PATIENT OUTREACH (OUTPATIENT)
Dept: GASTROENTEROLOGY | Facility: CLINIC | Age: 73
End: 2023-10-26
Payer: COMMERCIAL

## 2024-05-02 ENCOUNTER — OFFICE VISIT (OUTPATIENT)
Dept: PEDIATRICS | Facility: CLINIC | Age: 74
End: 2024-05-02
Payer: COMMERCIAL

## 2024-05-02 VITALS
BODY MASS INDEX: 27.7 KG/M2 | SYSTOLIC BLOOD PRESSURE: 124 MMHG | TEMPERATURE: 98 F | DIASTOLIC BLOOD PRESSURE: 64 MMHG | RESPIRATION RATE: 14 BRPM | WEIGHT: 193.5 LBS | HEART RATE: 72 BPM | OXYGEN SATURATION: 95 % | HEIGHT: 70 IN

## 2024-05-02 DIAGNOSIS — G44.209 TENSION HEADACHE: Primary | ICD-10-CM

## 2024-05-02 PROCEDURE — 99213 OFFICE O/P EST LOW 20 MIN: CPT | Performed by: PHYSICIAN ASSISTANT

## 2024-05-02 PROCEDURE — G2211 COMPLEX E/M VISIT ADD ON: HCPCS | Performed by: PHYSICIAN ASSISTANT

## 2024-05-02 RX ORDER — RESPIRATORY SYNCYTIAL VIRUS VACCINE 120MCG/0.5
0.5 KIT INTRAMUSCULAR ONCE
Qty: 1 EACH | Refills: 0 | Status: CANCELLED | OUTPATIENT
Start: 2024-05-02 | End: 2024-05-02

## 2024-05-02 ASSESSMENT — PATIENT HEALTH QUESTIONNAIRE - PHQ9
SUM OF ALL RESPONSES TO PHQ QUESTIONS 1-9: 3
10. IF YOU CHECKED OFF ANY PROBLEMS, HOW DIFFICULT HAVE THESE PROBLEMS MADE IT FOR YOU TO DO YOUR WORK, TAKE CARE OF THINGS AT HOME, OR GET ALONG WITH OTHER PEOPLE: NOT DIFFICULT AT ALL
SUM OF ALL RESPONSES TO PHQ QUESTIONS 1-9: 3

## 2024-05-02 NOTE — PROGRESS NOTES
"  Assessment & Plan     Tension headache      Patient was seen today for concerns of a potential shingles rash.  He is having tightness at the base of his neck that wraps to both sides of his lower head and goes up the head towards the ears.  He does not have any rashes or lesions or drainage noted on exam today.  This is not likely shingles as it crosses midline, no rash and there is not burning pain with palpation.  He is seeing Physical Therapy for neck and shoulder pain and may be having tension headaches.  He has a normal neuro exam today and described 2/10 pain.  He was given home cares for tension headaches.  He was advised to followup if symptoms change or worsen in any way.  Patient understands and agrees with the plan today.          BMI  Estimated body mass index is 27.76 kg/m  as calculated from the following:    Height as of this encounter: 1.778 m (5' 10\").    Weight as of this encounter: 87.8 kg (193 lb 8 oz).         Margareth Ledesma is a 74 year old, presenting for the following health issues:  Derm Problem      5/2/2024     7:24 AM   Additional Questions   Roomed by Minerva Triana CMA     History of Present Illness       Reason for visit:  Possible shingles  Symptom onset:  More than a month  Symptoms include:  Scalp soreness  Symptom intensity:  Moderate  Symptom progression:  Staying the same  Had these symptoms before:  Yes    He eats 0-1 servings of fruits and vegetables daily.He consumes 1 sweetened beverage(s) daily.He exercises with enough effort to increase his heart rate 30 to 60 minutes per day.  He exercises with enough effort to increase his heart rate 7 days per week.   He is taking medications regularly.     Scalp/skin soreness, no skin changes that his wife can see. Mostly on the sides, sometimes across the bask or top of the head. No new meds, started a new shampoo yesterday. Got new glasses and thought they were the issue, but when he switched back to his old glasses, he still had " "the same issue.     He describes a tightness at the base of his head/top neck that wraps around both sides to his ears and up his head.  He states he has had this for the past month or so.  He has not tried any tylenol or ibuprofen for his pain.  He puts the pain at about a 2/10.  He also reports that he is currently in Physical Therapy for shoulder and neck tightness on the right side.  He is not having any visual sensations or changes and he denies any light or noise sensitivity.  He is not having any nausea or vomiting and he denies any weight loss.        Review of Systems  Constitutional, neuro, ENT, endocrine, pulmonary, cardiac, gastrointestinal, genitourinary, musculoskeletal, integument and psychiatric systems are negative, except as otherwise noted.      Objective    /64 (BP Location: Right arm, Patient Position: Sitting, Cuff Size: Adult Large)   Pulse 72   Temp 98  F (36.7  C) (Temporal)   Resp 14   Ht 1.778 m (5' 10\")   Wt 87.8 kg (193 lb 8 oz)   SpO2 95%   BMI 27.76 kg/m    Body mass index is 27.76 kg/m .  Physical Exam   GENERAL: alert and no distress  EYES: Eyes grossly normal to inspection, PERRL and conjunctivae and sclerae normal  NECK: no adenopathy, no asymmetry, masses, or scars  MS: no gross musculoskeletal defects noted, no edema  SKIN: no suspicious lesions or rashes  NEURO: Normal strength and tone, mentation intact and speech normal  NEURO: Normal strength and tone, sensory exam grossly normal, mentation intact, speech normal, and cranial nerves 2-12 intact        Signed Electronically by: Addie Iqbal PA-C    "

## 2024-07-30 ENCOUNTER — TELEPHONE (OUTPATIENT)
Dept: PEDIATRICS | Facility: CLINIC | Age: 74
End: 2024-07-30
Payer: COMMERCIAL

## 2024-07-30 NOTE — TELEPHONE ENCOUNTER
3rd attempt to schedule, left voicemail 8/01/2024. No mychart to send message.     2nd attempt to schedule, left voicemail 7/31/2024. AWV due after 10/06/2024    Called and left message for patient to call back to schedule his AWV on 7/30/2024. Due for AWV on 10/06/2024. If patient has not called back, please leave for UC staff to follow up.

## 2024-09-06 ENCOUNTER — PATIENT OUTREACH (OUTPATIENT)
Dept: CARE COORDINATION | Facility: CLINIC | Age: 74
End: 2024-09-06
Payer: COMMERCIAL

## 2024-09-20 ENCOUNTER — PATIENT OUTREACH (OUTPATIENT)
Dept: CARE COORDINATION | Facility: CLINIC | Age: 74
End: 2024-09-20
Payer: COMMERCIAL

## 2024-09-26 DIAGNOSIS — F41.1 GENERALIZED ANXIETY DISORDER: ICD-10-CM

## 2024-09-26 DIAGNOSIS — F51.04 PSYCHOPHYSIOLOGICAL INSOMNIA: ICD-10-CM

## 2024-09-26 RX ORDER — TRAZODONE HYDROCHLORIDE 100 MG/1
100 TABLET ORAL AT BEDTIME
Qty: 90 TABLET | Refills: 3 | Status: SHIPPED | OUTPATIENT
Start: 2024-09-26

## 2024-10-02 ENCOUNTER — TRANSFERRED RECORDS (OUTPATIENT)
Dept: HEALTH INFORMATION MANAGEMENT | Facility: CLINIC | Age: 74
End: 2024-10-02
Payer: COMMERCIAL

## 2024-10-05 DIAGNOSIS — F41.1 GENERALIZED ANXIETY DISORDER: ICD-10-CM

## 2024-10-07 RX ORDER — CITALOPRAM HYDROBROMIDE 10 MG/1
10 TABLET ORAL DAILY
Qty: 90 TABLET | Refills: 0 | Status: SHIPPED | OUTPATIENT
Start: 2024-10-07 | End: 2024-11-08

## 2024-10-23 ENCOUNTER — TRANSFERRED RECORDS (OUTPATIENT)
Dept: HEALTH INFORMATION MANAGEMENT | Facility: CLINIC | Age: 74
End: 2024-10-23
Payer: COMMERCIAL

## 2024-11-04 SDOH — HEALTH STABILITY: PHYSICAL HEALTH: ON AVERAGE, HOW MANY DAYS PER WEEK DO YOU ENGAGE IN MODERATE TO STRENUOUS EXERCISE (LIKE A BRISK WALK)?: 7 DAYS

## 2024-11-04 SDOH — HEALTH STABILITY: PHYSICAL HEALTH: ON AVERAGE, HOW MANY MINUTES DO YOU ENGAGE IN EXERCISE AT THIS LEVEL?: 30 MIN

## 2024-11-04 ASSESSMENT — SOCIAL DETERMINANTS OF HEALTH (SDOH): HOW OFTEN DO YOU GET TOGETHER WITH FRIENDS OR RELATIVES?: TWICE A WEEK

## 2024-11-06 ENCOUNTER — TRANSFERRED RECORDS (OUTPATIENT)
Dept: HEALTH INFORMATION MANAGEMENT | Facility: CLINIC | Age: 74
End: 2024-11-06
Payer: COMMERCIAL

## 2024-11-08 ENCOUNTER — OFFICE VISIT (OUTPATIENT)
Dept: PEDIATRICS | Facility: CLINIC | Age: 74
End: 2024-11-08
Attending: INTERNAL MEDICINE
Payer: COMMERCIAL

## 2024-11-08 VITALS
DIASTOLIC BLOOD PRESSURE: 80 MMHG | HEIGHT: 70 IN | TEMPERATURE: 97.9 F | BODY MASS INDEX: 27.09 KG/M2 | HEART RATE: 78 BPM | OXYGEN SATURATION: 98 % | SYSTOLIC BLOOD PRESSURE: 132 MMHG | RESPIRATION RATE: 14 BRPM | WEIGHT: 189.2 LBS

## 2024-11-08 DIAGNOSIS — F41.1 GENERALIZED ANXIETY DISORDER: ICD-10-CM

## 2024-11-08 DIAGNOSIS — Z23 NEED FOR SHINGLES VACCINE: ICD-10-CM

## 2024-11-08 DIAGNOSIS — R51.9 CHRONIC DAILY HEADACHE: ICD-10-CM

## 2024-11-08 DIAGNOSIS — Z00.00 ENCOUNTER FOR MEDICARE ANNUAL WELLNESS EXAM: Primary | ICD-10-CM

## 2024-11-08 DIAGNOSIS — Z11.59 NEED FOR HEPATITIS C SCREENING TEST: ICD-10-CM

## 2024-11-08 PROBLEM — R03.0 ELEVATED BLOOD PRESSURE READING WITHOUT DIAGNOSIS OF HYPERTENSION: Status: RESOLVED | Noted: 2023-10-06 | Resolved: 2024-11-08

## 2024-11-08 LAB
EST. AVERAGE GLUCOSE BLD GHB EST-MCNC: 120 MG/DL
HBA1C MFR BLD: 5.8 % (ref 0–5.6)

## 2024-11-08 PROCEDURE — 99397 PER PM REEVAL EST PAT 65+ YR: CPT | Performed by: INTERNAL MEDICINE

## 2024-11-08 PROCEDURE — 80061 LIPID PANEL: CPT | Performed by: INTERNAL MEDICINE

## 2024-11-08 PROCEDURE — 83036 HEMOGLOBIN GLYCOSYLATED A1C: CPT | Mod: GZ | Performed by: INTERNAL MEDICINE

## 2024-11-08 PROCEDURE — 36415 COLL VENOUS BLD VENIPUNCTURE: CPT | Performed by: INTERNAL MEDICINE

## 2024-11-08 PROCEDURE — 99213 OFFICE O/P EST LOW 20 MIN: CPT | Mod: 25 | Performed by: INTERNAL MEDICINE

## 2024-11-08 RX ORDER — NORTRIPTYLINE HYDROCHLORIDE 10 MG/1
CAPSULE ORAL
COMMUNITY
Start: 2024-10-02 | End: 2024-11-08

## 2024-11-08 RX ORDER — NORTRIPTYLINE HYDROCHLORIDE 10 MG/1
30 CAPSULE ORAL AT BEDTIME
Qty: 270 CAPSULE | Refills: 3 | COMMUNITY
Start: 2024-11-08

## 2024-11-08 RX ORDER — CITALOPRAM HYDROBROMIDE 10 MG/1
10 TABLET ORAL DAILY
Qty: 90 TABLET | Refills: 3 | Status: SHIPPED | OUTPATIENT
Start: 2024-11-08

## 2024-11-08 NOTE — PROGRESS NOTES
"Preventive Care Visit  [facility] NITO Tsang MD, Internal Medicine - Pediatrics  Nov 8, 2024      Assessment & Plan     Need for shingles vaccine      Need for hepatitis C screening test      Generalized anxiety disorder  Stable.  Ongoing celexa.   - citalopram (CELEXA) 10 MG tablet; Take 1 tablet (10 mg) by mouth daily.  - OFFICE/OUTPT VISIT,EST,LEVL III  - OFFICE/OUTPT VISIT,EST,LEVL III    Encounter for Medicare annual wellness exam  Discussed diet, exercise, testicular self exam, blood pressure, cholesterol, and need for cancer surveillance at appropriate ages.   - Lipid panel reflex to direct LDL Fasting; Future  - Hemoglobin A1c; Future  - Lipid panel reflex to direct LDL Fasting  - Hemoglobin A1c    Chronic daily headache  Finally improved with accupuncture.  Ongoing nortryptiline at night.     Patient has been advised of split billing requirements and indicates understanding: Yes        BMI  Estimated body mass index is 27.15 kg/m  as calculated from the following:    Height as of this encounter: 1.778 m (5' 10\").    Weight as of this encounter: 85.8 kg (189 lb 3.2 oz).   Weight management plan: Discussed healthy diet and exercise guidelines    Counseling  Appropriate preventive services were addressed with this patient via screening, questionnaire, or discussion as appropriate for fall prevention, nutrition, physical activity, Tobacco-use cessation, social engagement, weight loss and cognition.  Checklist reviewing preventive services available has been given to the patient.  Reviewed patient's diet, addressing concerns and/or questions.           Margareth Ledesma is a 74 year old, presenting for the following:  Physical        11/8/2024     1:15 PM   Additional Questions   Roomed by Minerva Triana CMA           HPI  Seen several times for headache; derm, chiropracter, accupuncture.  Had MRI and CT; even seen by neurology.  Accupuncture finally helped.     Sees dentist every 4 months. " Had lost some teeth.    Sees eye doc yearly.    No problem with hearing.    Exercises daily ; does stretching and weights.            Health Care Directive      11/4/2024   General Health   How would you rate your overall physical health? Good   Feel stress (tense, anxious, or unable to sleep) Not at all            11/4/2024   Nutrition   Diet: Regular (no restrictions)            11/4/2024   Exercise   Days per week of moderate/strenous exercise 7 days   Average minutes spent exercising at this level 30 min            11/4/2024   Social Factors   Frequency of gathering with friends or relatives Twice a week   Worry food won't last until get money to buy more No   Food not last or not have enough money for food? No   Do you have housing? (Housing is defined as stable permanent housing and does not include staying ouside in a car, in a tent, in an abandoned building, in an overnight shelter, or couch-surfing.) Yes   Are you worried about losing your housing? No   Lack of transportation? No   Unable to get utilities (heat,electricity)? No            11/4/2024   Fall Risk   Fallen 2 or more times in the past year? No     No    Trouble with walking or balance? No     No        Patient-reported    Multiple values from one day are sorted in reverse-chronological order          11/4/2024   Activities of Daily Living- Home Safety   Needs help with the following daily activites None of the above   Safety concerns in the home None of the above            11/4/2024   Dental   Dentist two times every year? Yes            11/4/2024   Hearing Screening   Hearing concerns? None of the above            11/4/2024   Driving Risk Screening   Patient/family members have concerns about driving No            11/4/2024   General Alertness/Fatigue Screening   Have you been more tired than usual lately? No            11/4/2024   Urinary Incontinence Screening   Bothered by leaking urine in past 6 months No            11/4/2024   TB  Screening   Were you born outside of the [place]? No          Today's PHQ-9 Score:       11/8/2024    11:52 AM   PHQ-9 SCORE   PHQ-9 Total Score MyChart 3 (Minimal depression)   PHQ-9 Total Score 3        Patient-reported         11/4/2024   Substance Use   Alcohol more than 3/day or more than 7/wk No   Do you have a current opioid prescription? No   How severe/bad is pain from 1 to 10? 0/10 (No Pain)   Do you use any other substances recreationally? No        Social History     Tobacco Use    Smoking status: Never    Smokeless tobacco: Never   Vaping Use    Vaping status: Never Used   Substance Use Topics    Alcohol use: Yes     Alcohol/week: 3.3 standard drinks of alcohol     Comment: Alcoholic Drinks/day: 4 beer bottle size hard cider per week    Drug use: No           11/4/2024   AAA Screening   Family history of Abdominal Aortic Aneurysm (AAA)? No      ASCVD Risk   The 10-year ASCVD risk score (Mayte LORA, et al., 2019) is: 19.6%    Values used to calculate the score:      Age: 74 years      Sex: Male      Is Non- : No      Diabetic: No      Tobacco smoker: No      Systolic Blood Pressure: 132 mmHg      Is BP treated: No      HDL Cholesterol: 67 mg/dL      Total Cholesterol: 173 mg/dL            Reviewed and updated as needed this visit by Provider                    Past Medical History:   Diagnosis Date    Depressive disorder     Dysthymic disorder     well controlled on Effexor    Hypertension     Post op    Personal history of colonic polyps 01/01/2007    needs colonoscopic follow-up in 2012     Past Surgical History:   Procedure Laterality Date    ABDOMEN SURGERY      Bowel resection    ARTHROSCOPY SHOULDER ROTATOR CUFF REPAIR Right     COLONOSCOPY      HEMORRHOIDECTOMY INTERNAL LIGATION      Description: Hemorrhoidectomy;  Recorded: 10/24/2011;    KNEE ARTHROSCOPY W/ PARTIAL MEDIAL MENISCECTOMY Left     KY LAMNOTMY INCL W/DCMPRSN NRV ROOT 1 INTRSPC LUMBR      Description:  "Hemilaminectomy With Disc Removal One Lumbar Interspace;  Recorded: 10/24/2011;    SOFT TISSUE SURGERY      [facility] DRAINAGE OF DEEP RECTAL ABSCESS  01/01/1999    Goldberg, kris for perirectal abscesses    ZZ TOMLIN W/O FACETEC FORAMOT/DSKC 1/2 VRT SEG, LUMBAR  01/01/1998    indication was pain with bulging disc     Current providers sharing in care for this patient include:  Patient Care Team:  Lenin Tsang MD as PCP - General (Internal Medicine - Pediatrics)  Lenin Tsang MD as Assigned PCP    The following health maintenance items are reviewed in Epic and correct as of today:  Health Maintenance   Topic Date Due    DEPRESSION ACTION PLAN  Never done    HEPATITIS C SCREENING  Never done    Pneumococcal Vaccine: 65+ Years (1 of 1 - PCV) Never done    ZOSTER IMMUNIZATION (2 of 2) 06/16/2016    ANNUAL REVIEW OF HM ORDERS  02/16/2023    INFLUENZA VACCINE (1) Never done    COVID-19 Vaccine (1 - 2024-25 season) Never done    MEDICARE ANNUAL WELLNESS VISIT  10/06/2024    PHQ-9  05/08/2025    FALL RISK ASSESSMENT  11/08/2025    RSV VACCINE (1 - 1-dose 75+ series) 03/27/2026    GLUCOSE  10/10/2026    DTAP/TDAP/TD IMMUNIZATION (5 - Td or Tdap) 05/01/2027    ADVANCE CARE PLANNING  10/06/2028    LIPID  10/10/2028    COLORECTAL CANCER SCREENING  11/17/2028    HPV IMMUNIZATION  Aged Out    MENINGITIS IMMUNIZATION  Aged Out    RSV MONOCLONAL ANTIBODY  Aged Out         Review of Systems  Constitutional, HEENT, cardiovascular, pulmonary, GI, , musculoskeletal, neuro, skin, endocrine and psych systems are negative, except as otherwise noted.     Objective    Exam  /80 (BP Location: Right arm, Patient Position: Sitting, Cuff Size: Adult Large)   Pulse 78   Temp 97.9  F (36.6  C) (Temporal)   Resp 14   Ht 1.778 m (5' 10\")   Wt 85.8 kg (189 lb 3.2 oz)   SpO2 98%   BMI 27.15 kg/m     Estimated body mass index is 27.15 kg/m  as calculated from the following:    Height as of this encounter: 1.778 m (5' 10\").    Weight as " of this encounter: 85.8 kg (189 lb 3.2 oz).    Physical Exam  GENERAL: alert and no distress  EYES: Eyes grossly normal to inspection, PERRL and conjunctivae and sclerae normal  HENT: ear canals and TM's normal, nose and mouth without ulcers or lesions  NECK: no adenopathy, no asymmetry, masses, or scars  RESP: lungs clear to auscultation - no rales, rhonchi or wheezes  CV: regular rate and rhythm, normal S1 S2, no S3 or S4, no murmur, click or rub, no peripheral edema  ABDOMEN: soft, nontender, no hepatosplenomegaly, no masses and bowel sounds normal   (male): normal male genitalia without lesions or urethral discharge, no hernia  MS: no gross musculoskeletal defects noted, no edema  SKIN: no suspicious lesions or rashes  NEURO: Normal strength and tone, mentation intact and speech normal  PSYCH: mentation appears normal, affect normal/bright        11/8/2024   Mini Cog   Clock Draw Score 2 Normal   3 Item Recall 3 objects recalled   Mini Cog Total Score 5                 Signed Electronically by: Lenin Tsang MD    Answers submitted by the patient for this visit:  Patient Health Questionnaire (Submitted on 11/8/2024)  If you checked off any problems, how difficult have these problems made it for you to do your work, take care of things at home, or get along with other people?: Not difficult at all  PHQ9 TOTAL SCORE: 3

## 2024-11-08 NOTE — PATIENT INSTRUCTIONS
Consider colonoscopy in 2026.    Lab work today:  We can do labs in the exam room today, or you can get them done downstairs in the lab.      No cancer screenings are due.    Refills today for celexa, nortriptyline, and trazodone for the year.    For impaired fasting glucose:     Try to limit complex carbs (rice, bread, pasta, potatoes, cereals) and simple carbs (pop, juice, alcohol, and even milk.)  Eating more lean proteins (chicken, fish, eggs, beans, nuts) and unlimited vegetables and fruits can definitely help as well.     In general, try to spread meals out during the day, eating smaller breakfasts, lunches and dinners--and having healthy snacks in between.  It's a good idea to limit your carbs at mealtimes to 60-75 grams of carbs, and to limit your carbs at snacktimes to 15-30 grams of carbs.  (Check the food labels to add up these carbs.)     Lenin Tsang MD  Internal Medicine and Pediatrics           Patient Education   Preventive Care Advice   This is general advice given by our system to help you stay healthy. However, your care team may have specific advice just for you. Please talk to your care team about your preventive care needs.  Nutrition  Eat 5 or more servings of fruits and vegetables each day.  Try wheat bread, brown rice and whole grain pasta (instead of white bread, rice, and pasta).  Get enough calcium and vitamin D. Check the label on foods and aim for 100% of the RDA (recommended daily allowance).  Lifestyle  Exercise at least 150 minutes each week  (30 minutes a day, 5 days a week).  Do muscle strengthening activities 2 days a week. These help control your weight and prevent disease.  No smoking.  Wear sunscreen to prevent skin cancer.  Have a dental exam and cleaning every 6 months.  Yearly exams  See your health care team every year to talk about:  Any changes in your health.  Any medicines your care team has prescribed.  Preventive care, family planning, and ways to prevent chronic  diseases.  Shots (vaccines)   HPV shots (up to age 26), if you've never had them before.  Hepatitis B shots (up to age 59), if you've never had them before.  COVID-19 shot: Get this shot when it's due.  Flu shot: Get a flu shot every year.  Tetanus shot: Get a tetanus shot every 10 years.  Pneumococcal, hepatitis A, and RSV shots: Ask your care team if you need these based on your risk.  Shingles shot (for age 50 and up)  General health tests  Diabetes screening:  Starting at age 35, Get screened for diabetes at least every 3 years.  If you are younger than age 35, ask your care team if you should be screened for diabetes.  Cholesterol test: At age 39, start having a cholesterol test every 5 years, or more often if advised.  Bone density scan (DEXA): At age 50, ask your care team if you should have this scan for osteoporosis (brittle bones).  Hepatitis C: Get tested at least once in your life.  STIs (sexually transmitted infections)  Before age 24: Ask your care team if you should be screened for STIs.  After age 24: Get screened for STIs if you're at risk. You are at risk for STIs (including HIV) if:  You are sexually active with more than one person.  You don't use condoms every time.  You or a partner was diagnosed with a sexually transmitted infection.  If you are at risk for HIV, ask about PrEP medicine to prevent HIV.  Get tested for HIV at least once in your life, whether you are at risk for HIV or not.  Cancer screening tests  Cervical cancer screening: If you have a cervix, begin getting regular cervical cancer screening tests starting at age 21.  Breast cancer scan (mammogram): If you've ever had breasts, begin having regular mammograms starting at age 40. This is a scan to check for breast cancer.  Colon cancer screening: It is important to start screening for colon cancer at age 45.  Have a colonoscopy test every 10 years (or more often if you're at risk) Or, ask your provider about stool tests like a  FIT test every year or Cologuard test every 3 years.  To learn more about your testing options, visit:   .  For help making a decision, visit:   https://bit.ly/mr40230.  Prostate cancer screening test: If you have a prostate, ask your care team if a prostate cancer screening test (PSA) at age 55 is right for you.  Lung cancer screening: If you are a current or former smoker ages 50 to 80, ask your care team if ongoing lung cancer screenings are right for you.  For informational purposes only. Not to replace the advice of your health care provider. Copyright   2023 [employer]. All rights reserved. Clinically reviewed by the [facility] Transitions Program. Go2call.com [id number] - REV 01/24.

## 2024-11-09 LAB
CHOLEST SERPL-MCNC: 197 MG/DL
FASTING STATUS PATIENT QL REPORTED: ABNORMAL
HDLC SERPL-MCNC: 73 MG/DL
LDLC SERPL CALC-MCNC: 109 MG/DL
NONHDLC SERPL-MCNC: 124 MG/DL
TRIGL SERPL-MCNC: 73 MG/DL

## 2025-03-25 ENCOUNTER — MYC MEDICAL ADVICE (OUTPATIENT)
Dept: PEDIATRICS | Facility: CLINIC | Age: 75
End: 2025-03-25
Payer: COMMERCIAL

## 2025-03-25 ENCOUNTER — MYC REFILL (OUTPATIENT)
Dept: PEDIATRICS | Facility: CLINIC | Age: 75
End: 2025-03-25
Payer: COMMERCIAL

## 2025-03-25 DIAGNOSIS — F34.1 DYSTHYMIA: ICD-10-CM

## 2025-03-25 DIAGNOSIS — F41.1 GENERALIZED ANXIETY DISORDER: Primary | ICD-10-CM

## 2025-03-25 RX ORDER — NORTRIPTYLINE HYDROCHLORIDE 10 MG/1
30 CAPSULE ORAL AT BEDTIME
Qty: 270 CAPSULE | Refills: 3 | Status: SHIPPED | OUTPATIENT
Start: 2025-03-25